# Patient Record
Sex: FEMALE | Race: BLACK OR AFRICAN AMERICAN | NOT HISPANIC OR LATINO | Employment: UNEMPLOYED | RURAL
[De-identification: names, ages, dates, MRNs, and addresses within clinical notes are randomized per-mention and may not be internally consistent; named-entity substitution may affect disease eponyms.]

---

## 2017-02-14 ENCOUNTER — OFFICE VISIT (OUTPATIENT)
Dept: GASTROENTEROLOGY | Facility: CLINIC | Age: 57
End: 2017-02-14

## 2017-02-14 VITALS
HEART RATE: 62 BPM | SYSTOLIC BLOOD PRESSURE: 108 MMHG | HEIGHT: 63 IN | BODY MASS INDEX: 21.44 KG/M2 | WEIGHT: 121 LBS | DIASTOLIC BLOOD PRESSURE: 70 MMHG

## 2017-02-14 DIAGNOSIS — B18.2 CHRONIC HEPATITIS C WITHOUT HEPATIC COMA (HCC): ICD-10-CM

## 2017-02-14 DIAGNOSIS — K21.00 GASTROESOPHAGEAL REFLUX DISEASE WITH ESOPHAGITIS: Primary | ICD-10-CM

## 2017-02-14 DIAGNOSIS — D50.8 OTHER IRON DEFICIENCY ANEMIA: ICD-10-CM

## 2017-02-14 DIAGNOSIS — K59.00 CONSTIPATION, UNSPECIFIED CONSTIPATION TYPE: ICD-10-CM

## 2017-02-14 PROCEDURE — 99213 OFFICE O/P EST LOW 20 MIN: CPT | Performed by: PHYSICIAN ASSISTANT

## 2017-02-14 RX ORDER — FERROUS SULFATE 325(65) MG
325 TABLET ORAL 2 TIMES DAILY
Qty: 60 TABLET | Refills: 3 | Status: SHIPPED | OUTPATIENT
Start: 2017-02-14 | End: 2018-02-15 | Stop reason: SDUPTHER

## 2017-02-14 NOTE — PROGRESS NOTES
Chief Complaint   Patient presents with   • Hepatitis C   • Nausea   • Abdominal Pain   • Constipation   • Heartburn       ENDO PROCEDURE ORDERED:    Subjective    Sultana Saldana is a 56 y.o. female. she is here today for follow-up.    History of Present Illness    Patient seen on a recheck of her chronic hepatitis C, abdominal pain, GERD, constipation.  Last seen 12/20/16.  Genotype 1A.  Laboratory from last visit showed normal or negative ceruloplasmin, GGT, LFTs, INR, alpha-1 antitrypsin, AMA, SMA, DENIS, AFP, she had iron deficiency with serum iron of 15.  HCV fibro-sure 0.19/F0, necroinflammation 0.15/A0.  Patient was also given a trial on Amitiza for chronic constipation.  She states is not really helping.  She is still taking a stool softener but not having good bowel movements.  She states Wednesday she had a lot of bleeding, she filled the commode with clots on the tissue paper, she strained hard before she saw the blood.  Her last colonoscopy was by Dr. Mcintosh and showed hemorrhoids on for/12/16.  She had a diffuse gastritis on EGD on 11/18/16.  She complains of 7 out of 10 lower abdominal pain.  She states cold all the time.  Her weight is up 6 pounds since last visit.  She is on Zantac twice a day for chronic GERD.  Denied nausea, vomiting.    A/P: Moderate epigastric tenderness.  Chronic active hepatitis C, she does not meet treatment criteria according to her insurance.  We'll continue to follow.  She will be due for hepatoma screening in August.  Recommend iron supplementation.  We'll give a trial on Linzess for the constipation and she may stop the Amitiza.  We'll follow-up in 3 months with laboratory prior.  Sooner if needed.  Further pending clinical course and the results of the above.     The following portions of the patient's history were reviewed and updated as appropriate:   Past Medical History   Diagnosis Date   • Acute low back pain    • Acute otitis media    • Acute sinusitis    • Acute  upper respiratory infection    • Asthma    • Bunion    • Degenerative joint disease involving multiple joints    • Dysfunctional uterine bleeding    • Encounter for gynecological examination without abnormal finding    • Essential hypertension    • GEOVANI (generalized anxiety disorder)    • Ganglion and cyst of synovium, tendon and bursa    • Knee pain      strain   • Menopause    • Neck pain    • Onychomycosis    • Otitis media    • Pain in right shoulder    • Postmenopausal bleeding    • Rectal hemorrhage    • Right upper quadrant pain    • Seizure disorder    • Shoulder pain      oa   • Surgery follow-up      Past Surgical History   Procedure Laterality Date   • Injection of medication  02/01/2016     Celestone (betamethasone) (2)      • Hysteroscopy  07/17/2012     exam under anesthesia, diagnostic hysteroscopy, Fractional D&C. Post menopausal bleeding   • Pap smear  10/20/2011     negative   • Upper gastrointestinal endoscopy  11/18/2016   • Colonoscopy  04/12/2016     Family History   Problem Relation Age of Onset   • Adopted: Yes   • Cancer Sister      OB History     No data available        Allergies   Allergen Reactions   • Diphenhydramine      Social History     Social History   • Marital status: Legally      Spouse name: N/A   • Number of children: N/A   • Years of education: N/A     Social History Main Topics   • Smoking status: Never Smoker   • Smokeless tobacco: Never Used   • Alcohol use No   • Drug use: No   • Sexual activity: Not Asked     Other Topics Concern   • None     Social History Narrative       Current Outpatient Prescriptions:   •  acetaminophen-codeine (TYLENOL #3) 300-30 MG per tablet, , Disp: , Rfl:   •  albuterol (PROVENTIL) (2.5 MG/3ML) 0.083% nebulizer solution, Take 2.5 mg by nebulization 4 (four) times a day., Disp: , Rfl:   •  benzonatate (TESSALON) 200 MG capsule, Take 200 mg by mouth 3 (three) times a day as needed for cough., Disp: , Rfl:   •  citalopram (CeleXA) 10 MG  tablet, , Disp: , Rfl:   •  cyclobenzaprine (FLEXERIL) 5 MG tablet, , Disp: , Rfl:   •  docusate calcium (STOOL SOFTENER) 240 MG capsule, Take 240 mg by mouth 2 (two) times a day., Disp: , Rfl:   •  doxepin (SINEquan) 25 MG capsule, Take 25 mg by mouth 3 (three) times a day as needed for sleep., Disp: , Rfl:   •  estrogen, conjugated,-medroxyprogesterone (PREMPRO) 0.3-1.5 MG per tablet, Take 1 tablet by mouth daily., Disp: , Rfl:   •  ferrous sulfate 325 (65 FE) MG tablet, Take 1 tablet by mouth 2 (Two) Times a Day., Disp: 60 tablet, Rfl: 3  •  fluticasone-salmeterol (ADVAIR DISKUS) 250-50 MCG/DOSE DISKUS, Inhale 1 puff 2 (two) times a day., Disp: , Rfl:   •  Linaclotide 145 MCG capsule, Take 145 mcg by mouth Daily., Disp: 30 capsule, Rfl: 3  •  lisinopril-hydrochlorothiazide (PRINZIDE,ZESTORETIC) 10-12.5 MG per tablet, Take 1 tablet by mouth daily., Disp: 30 tablet, Rfl: 3  •  loratadine (CLARITIN) 10 MG tablet, Take 10 mg by mouth daily., Disp: , Rfl:   •  methocarbamol (ROBAXIN) 500 MG tablet, Take 1 tablet by mouth 3 (Three) Times a Day As Needed for muscle spasms., Disp: 60 tablet, Rfl: 3  •  mirtazapine (REMERON) 15 MG tablet, , Disp: , Rfl:   •  mirtazapine (REMERON) 30 MG tablet, Take 30 mg by mouth daily., Disp: , Rfl:   •  neomycin-polymyxin-hydrocortisone (CORTISPORIN) 1 % solution otic solution, Administer 4 drops into both ears 4 (four) times a day., Disp: , Rfl:   •  OLANZapine (ZYPREXA) 10 MG tablet, Take 10 mg by mouth every night., Disp: , Rfl:   •  ondansetron ODT (ZOFRAN-ODT) 4 MG disintegrating tablet, Take 1 tablet by mouth Every 8 (Eight) Hours As Needed for nausea or vomiting., Disp: 30 tablet, Rfl: 1  •  OXcarbazepine (TRILEPTAL) 300 MG tablet, 300 mg 2 (Two) Times a Day., Disp: , Rfl:   •  Oxycodone-Acetaminophen (PERCOCET PO), Take  by mouth every 6 (six) hours as needed., Disp: , Rfl:   •  potassium chloride (KAYCIEL) 20 MEQ/15ML (10%) solution, Take  by mouth Daily., Disp: , Rfl:   •   "promethazine (PHENERGAN) 25 MG tablet, , Disp: , Rfl:   •  ranitidine (ZANTAC) 150 MG capsule, Take 1 capsule by mouth 2 (Two) Times a Day., Disp: 60 capsule, Rfl: 3  •  Spacer/Aero-Holding Chambers (AEROCHAMBER PLUS KAREN-VU) misc, , Disp: , Rfl:   •  sulindac (CLINORIL) 200 MG tablet, Take 1 tablet by mouth 2 (two) times a day., Disp: 60 tablet, Rfl: 3  •  VENTOLIN  (90 BASE) MCG/ACT inhaler, INHALE 2 PUFFS 4 TIMES DAILY, Disp: 18 g, Rfl: 0  Review of Systems  Review of Systems       Objective      Visit Vitals   • /70 (BP Location: Left arm)   • Pulse 62   • Ht 63\" (160 cm)   • Wt 121 lb (54.9 kg)   • BMI 21.43 kg/m2     Physical Exam   Constitutional: She is oriented to person, place, and time. She appears well-developed and well-nourished. She appears distressed.   AA   HENT:   Head: Normocephalic and atraumatic.   Eyes: EOM are normal. Pupils are equal, round, and reactive to light.   Neck: Normal range of motion.   Cardiovascular: Normal rate, regular rhythm and normal heart sounds.    Pulmonary/Chest: Effort normal and breath sounds normal.   Abdominal: Soft. Bowel sounds are normal. She exhibits no shifting dullness, no distension, no abdominal bruit, no ascites and no mass. There is no hepatosplenomegaly. There is tenderness. There is no rigidity, no rebound, no guarding and no CVA tenderness. No hernia. Hernia confirmed negative in the ventral area.   Musculoskeletal: Normal range of motion.   Neurological: She is alert and oriented to person, place, and time.   Skin: Skin is warm and dry.   Psychiatric: She has a normal mood and affect. Her behavior is normal. Judgment and thought content normal.   Nursing note and vitals reviewed.    Hospital Outpatient Visit on 12/20/2016   Component Date Value Ref Range Status   • DENIS 12/20/2016 Negative   Final    Comment:                                      Negative   <1:80                                       Borderline  1:80                            "            Positive   >1:80  Performed at:   - [facility]  [id number]  : Ramakrishna Lamas PhD, Phone:  [phone]       Assessment/Plan      1. Gastroesophageal reflux disease with esophagitis    2. Constipation, unspecified constipation type    3. Chronic hepatitis C without hepatic coma    4. Other iron deficiency anemia    .   Sultana was seen today for hepatitis c, nausea, abdominal pain, constipation and heartburn.    Diagnoses and all orders for this visit:    Gastroesophageal reflux disease with esophagitis    Constipation, unspecified constipation type  -     Linaclotide 145 MCG capsule; Take 145 mcg by mouth Daily.    Chronic hepatitis C without hepatic coma  -     CBC Auto Differential; Future  -     Comprehensive Metabolic Panel; Future  -     Iron; Future  -     HCV FibroSURE; Future    Other iron deficiency anemia  -     ferrous sulfate 325 (65 FE) MG tablet; Take 1 tablet by mouth 2 (Two) Times a Day.  -     CBC Auto Differential; Future  -     Iron; Future        Orders placed during this encounter include:  Orders Placed This Encounter   Procedures   • CBC Auto Differential     Due in May     Standing Status:   Future     Standing Expiration Date:   5/26/2017   • Comprehensive Metabolic Panel     Due in May     Standing Status:   Future     Standing Expiration Date:   5/26/2017   • Iron     Due in May     Standing Status:   Future     Standing Expiration Date:   5/26/2017   • HCV FibroSURE     Due in May     Standing Status:   Future     Standing Expiration Date:   5/26/2017       Medications prescribed:  New Medications Ordered This Visit   Medications   • ferrous sulfate 325 (65 FE) MG tablet     Sig: Take 1 tablet by mouth 2 (Two) Times a Day.     Dispense:  60 tablet     Refill:  3   • Linaclotide 145 MCG capsule     Sig: Take 145 mcg by mouth Daily.     Dispense:  30 capsule     Refill:  3     Discontinued Medications       Reason for Discontinue     lubiprostone (AMITIZA) 24 MCG capsule Not Efficacious        Requested Prescriptions     Signed Prescriptions Disp Refills   • ferrous sulfate 325 (65 FE) MG tablet 60 tablet 3     Sig: Take 1 tablet by mouth 2 (Two) Times a Day.   • Linaclotide 145 MCG capsule 30 capsule 3     Sig: Take 145 mcg by mouth Daily.       Review and/or summary of lab tests, radiology, procedures, medications. Review and summary of old records and obtaining of history. The risks and benefits of my recommendations, as well as other treatment options were discussed with the patient today. Questions were answered.    Follow-up: Return in about 3 months (around 5/14/2017), or if symptoms worsen or fail to improve, for lab prior.           This document has been electronically signed by Diaz Evangelista PA-C on February 27, 2017 6:23 PM      Results for orders placed or performed during the hospital encounter of 12/20/16   Nuclear Antigen Antibody, IFA   Result Value Ref Range    DENIS Negative    Results for orders placed or performed in visit on 12/20/16   HCV FibroSURE   Result Value Ref Range    Fibrosis Score 0.19 0.00 - 0.21    Fibrosis Stage Comment     Necroinflammat Activity Score 0.15 0.00 - 0.17    Necroinflammat Activity Grade A0-No activity     Alpha 2-Macroglobulins, Qn 279 (H) 110 - 276 mg/dL    Haptoglobin 145 34 - 200 mg/dL    Apolipoprotein A-1 141 116 - 209 mg/dL    Total Bilirubin 0.2 0.0 - 1.2 mg/dL    GGT 28 0 - 60 IU/L    ALT (SGPT) 33 0 - 40 IU/L    HCV Qual Interp Comment     Fibrosis Scoring: Comment     Necroinflamm Activity Scoring: Comment     Limitations: Comment     Comment Comment    Iron and TIBC   Result Value Ref Range    Iron 15 (L) 37 - 170 ug/dl    TIBC 293 265 - 497 ug/dl    Iron Saturation 5.1 (L) 15.0 - 50.0 %   Alpha - 1 - Antitrypsin   Result Value Ref Range    A-1 Antitrypsin 149 90 - 200 mg/dL   Mitochondrial Antibodies, M2   Result Value Ref Range    Mitochondrial Ab <20.0 0.0 - 20.0 Units    Ceruloplasmin   Result Value Ref Range    Ceruloplasmin 28.2 19.0 - 39.0 mg/dL   AFP Tumor Marker   Result Value Ref Range    AFP Tumor Marker 4.9 0.0 - 8.3 ng/mL   Hepatitis C Genotype   Result Value Ref Range    Note: Comment     Hepatitis C Genotype 1a    Anti-Smooth Muscle Antibody Titer   Result Value Ref Range    Smooth Muscle Ab 9 0 - 19 Units   Protime-INR   Result Value Ref Range    Protime 12.0 11.1 - 15.3 seconds    INR 0.9 0.8 - 1.2   Gamma GT   Result Value Ref Range    GGT 33 12 - 43 U/L   Ferritin   Result Value Ref Range    Ferritin 128.0 11.1 - 264.0 ng/ml   Hepatic Function Panel   Result Value Ref Range    Total Protein 7.1 6.3 - 8.6 gm/dl    Albumin 3.8 3.4 - 4.8 gm/dl    Bilirubin, Direct 0.0 0.0 - 0.3 mg/dl    Total Bilirubin 0.3 0.2 - 1.3 mg/dl    Alkaline Phosphatase 71 38 - 126 U/L    AST (SGOT) 33 14 - 36 U/L    ALT (SGPT) 44 9 - 52 U/L   Results for orders placed or performed during the hospital encounter of 11/18/16   Converted Surgical Pathology   Result Value Ref Range    Spec Descr 1 SPECIMEN(S): A SMALL BOWEL BIOPSY     Specimen description 2 SPECIMEN(S): B ANTRAL BIOPSY    Results for orders placed or performed during the hospital encounter of 11/15/16   Hepatitis C RNA, Quantitative, PCR (graph)   Result Value Ref Range    Hepatitis C Quantitation 330095 IU/mL    HCV log10 5.708 log10 IU/mL    Test Information Comment    Results for orders placed or performed during the hospital encounter of 11/08/16   Glia(IgA / G) & TTG(IgA / G)   Result Value Ref Range    Tissue Transglutaminase IgA <2 0 - 3 U/mL    Tissue Transglutaminase IgG <2 0 - 5 U/mL    Gliadin Deamidated Peptide Ab, IgA 4 0 - 19 units    Deaminated Gliadin Ab IgG 2 0 - 19 units   Allergens (12) Foods   Result Value Ref Range    Peanut <0.10 Class 0 kU/L    Milk, Cow's <0.10 Class 0 kU/L    Soybean <0.10 Class 0 kU/L    Wheat <0.10 Class 0 kU/L    CodFish <0.10 Class 0 kU/L    Shrimp <0.10 Class 0 kU/L    [place] <0.10  Class 0 kU/L    Egg White <0.10 Class 0 kU/L    Sesame Seed <0.10 Class 0 kU/L    Scallop <0.10 Class 0 kU/L    Hazelnut <0.10 Class 0 kU/L    Gluten <0.10 Class 0 kU/L    Class Description Comment    Results for orders placed or performed in visit on 11/08/16   Hepatitis Panel, Acute   Result Value Ref Range    Hepatitis B Surface Ag Negative Negative    Hep C Virus Ab Positive (qualifier value) (H) Negative    Hep A IgM Negative Negative    Hep B Core IgM Negative Negative   HIV-1 & HIV-2 Antibodies   Result Value Ref Range    HIV-1/ HIV-2 Non Reactive Non Reactive   Results for orders placed or performed in visit on 08/15/16    PAP SMEAR   Result Value Ref Range     Pap smear complete    Results for orders placed or performed during the hospital encounter of 08/02/16   Urinalysis, Microscopic only   Result Value Ref Range    WBC, UA TNTC (H) NONE SEEN,0-2,3-5  /HPF    RBC, UA 13-20 (H) NONE SEEN,0-2,3-5  /HPF    Bacteria, UA 4+ (H) NONE SEEN  /HPF    Epithelial Cells, UA 6-12 (H) NONE SEEN,0-2,3-5  /HPF   Urine drug screen   Result Value Ref Range    Barbiturates Screen, Urine Negative Negative    Benzodiazepine Screen, Urine Negative Negative    Methadone Screen, Urine Negative Negative    Opiate Screen, Urine Negative Negative    THC Screen Interpretation POSITIVE (H) Negative    Amphet/Methamphet, Screen, Urine Negative Negative    Cocaine Screen, Urine Negative Negative    Oxycodone Screen, Urine Negative Negative   Manual Differential   Result Value Ref Range    Neutrophil Rel % 30 (L) 37 - 80 %    Lymphocyte Rel % 62 (H) 10 - 50 %    Monocyte Rel % 4 0 - 12 %    Eosinophil Rel % 4 0 - 7 %    RBC Morphology Normocytic Normochromic     Platelet Estimate Normal     Total Counted 100    Urinalysis   Result Value Ref Range    Color, UA Yellow Yellow    Appearance CLOUDY     Specific Gravity, UA 1.025     pH, UA 5.5 pH Units    Leukocytes, UA 2+ (H) NEGATIVE    Nitrite, UA NEGATIVE NEGATIVE    Protein, UA  TRACE (H) NEGATIVE    Glucose, Urine NEGATIVE NEGATIVE mg/dl    Ketones, UA NEGATIVE NEGATIVE    Urobilinogen, UA 0.2 0.2 EU/dl    Blood, UA 1+ (H) NEGATIVE   CBC and Differential   Result Value Ref Range    WBC 4.5 3.2 - 9.8 x1000/uL    RBC 3.94 3.77 - 5.16 radha/mm3    Hemoglobin 12.0 12.0 - 15.5 gm/dl    Hematocrit 34.3 (L) 35.0 - 45.0 %    MCV 87.1 80.0 - 98.0 fl    MCH 30.5 26.0 - 34.0 pg    MCHC 35.0 31.4 - 36.0 gm/dl    RDW 14.8 (H) 11.5 - 14.5 %    Platelets 142 (L) 150 - 450 x1000/mm3    MPV 12.1 (H) 8.0 - 12.0 fl    Neutrophil Rel % 29.6 (L) 37.0 - 80.0 %    Lymphocyte Rel % 58.5 (H) 10.0 - 50.0 %    Monocyte Rel % 6.4 0.0 - 12.0 %    Eosinophil Rel % 5.1 0.0 - 7.0 %    Basophil Rel % 0.4 0.0 - 2.0 %    Immature Granulocyte Rel % 0.00 0.00 - 0.50 %    Neutrophils Absolute 1.34 (L) 2.00 - 8.60 x1000/uL    Lymphocytes Absolute 2.65 0.60 - 4.20 x1000/uL    Monocytes Absolute 0.29 0.00 - 0.90 x1000/uL    Eosinophils Absolute 0.23 0.00 - 0.70 x1000/uL    Basophils Absolute 0.02 0.00 - 0.20 x1000/uL    Immature Granulocytes Absolute 0.000 (L) 0.005 - 0.022 x1000/uL     *Note: Due to a large number of results and/or encounters for the requested time period, some results have not been displayed. A complete set of results can be found in Results Review.       Some portions of this note have been dictated using voice recognition software and may contain errors and/or omissions.

## 2017-02-20 ENCOUNTER — TELEPHONE (OUTPATIENT)
Dept: GASTROENTEROLOGY | Facility: CLINIC | Age: 57
End: 2017-02-20

## 2017-02-20 NOTE — TELEPHONE ENCOUNTER
Pt rx for Linzess 145 mcg qd required prior authorization.  Authorization is obtained and is valid 02/14/17-12/31/39.   [facility] is notified at [phone].   Attempted to contact pt at [phone].  Left voicemail with information and office number for pt to call if she has any questions.

## 2017-05-01 RX ORDER — POTASSIUM CHLORIDE 20MEQ/15ML
LIQUID (ML) ORAL
Qty: 450 ML | Refills: 0 | Status: SHIPPED | OUTPATIENT
Start: 2017-05-01 | End: 2017-09-01 | Stop reason: SDUPTHER

## 2017-05-01 RX ORDER — LISINOPRIL AND HYDROCHLOROTHIAZIDE 12.5; 1 MG/1; MG/1
TABLET ORAL
Qty: 30 TABLET | Refills: 0 | Status: SHIPPED | OUTPATIENT
Start: 2017-05-01 | End: 2017-09-01 | Stop reason: SDUPTHER

## 2017-05-22 ENCOUNTER — LAB (OUTPATIENT)
Dept: LAB | Facility: CLINIC | Age: 57
End: 2017-05-22

## 2017-05-22 DIAGNOSIS — D50.8 OTHER IRON DEFICIENCY ANEMIA: ICD-10-CM

## 2017-05-22 DIAGNOSIS — B18.2 CHRONIC HEPATITIS C WITHOUT HEPATIC COMA (HCC): ICD-10-CM

## 2017-05-22 DIAGNOSIS — R76.8 HCV ANTIBODY POSITIVE: ICD-10-CM

## 2017-05-22 PROCEDURE — 83883 ASSAY NEPHELOMETRY NOT SPEC: CPT | Performed by: PHYSICIAN ASSISTANT

## 2017-05-22 PROCEDURE — 86038 ANTINUCLEAR ANTIBODIES: CPT | Performed by: PHYSICIAN ASSISTANT

## 2017-05-22 PROCEDURE — 83540 ASSAY OF IRON: CPT | Performed by: PHYSICIAN ASSISTANT

## 2017-05-22 PROCEDURE — 85007 BL SMEAR W/DIFF WBC COUNT: CPT | Performed by: PHYSICIAN ASSISTANT

## 2017-05-22 PROCEDURE — 87522 HEPATITIS C REVRS TRNSCRPJ: CPT | Performed by: PHYSICIAN ASSISTANT

## 2017-05-22 PROCEDURE — 83010 ASSAY OF HAPTOGLOBIN QUANT: CPT | Performed by: PHYSICIAN ASSISTANT

## 2017-05-22 PROCEDURE — 80053 COMPREHEN METABOLIC PANEL: CPT | Performed by: PHYSICIAN ASSISTANT

## 2017-05-22 PROCEDURE — 82977 ASSAY OF GGT: CPT | Performed by: PHYSICIAN ASSISTANT

## 2017-05-22 PROCEDURE — 84460 ALANINE AMINO (ALT) (SGPT): CPT | Performed by: PHYSICIAN ASSISTANT

## 2017-05-22 PROCEDURE — 85025 COMPLETE CBC W/AUTO DIFF WBC: CPT | Performed by: PHYSICIAN ASSISTANT

## 2017-05-22 PROCEDURE — 82247 BILIRUBIN TOTAL: CPT | Performed by: PHYSICIAN ASSISTANT

## 2017-05-23 LAB
ALBUMIN SERPL-MCNC: 4.2 G/DL (ref 3.4–4.8)
ALBUMIN/GLOB SERPL: 1.3 G/DL (ref 1.1–1.8)
ALP SERPL-CCNC: 74 U/L (ref 38–126)
ALT SERPL W P-5'-P-CCNC: 59 U/L (ref 9–52)
ANION GAP SERPL CALCULATED.3IONS-SCNC: 11 MMOL/L (ref 5–15)
AST SERPL-CCNC: 48 U/L (ref 14–36)
BASOPHILS # BLD MANUAL: 0.05 10*3/MM3 (ref 0–0.2)
BASOPHILS NFR BLD AUTO: 1 % (ref 0–2)
BILIRUB SERPL-MCNC: 0.4 MG/DL (ref 0.2–1.3)
BUN BLD-MCNC: 22 MG/DL (ref 7–21)
BUN/CREAT SERPL: 26.8 (ref 7–25)
CALCIUM SPEC-SCNC: 9.7 MG/DL (ref 8.4–10.2)
CHLORIDE SERPL-SCNC: 102 MMOL/L (ref 95–110)
CO2 SERPL-SCNC: 28 MMOL/L (ref 22–31)
CREAT BLD-MCNC: 0.82 MG/DL (ref 0.5–1)
DEPRECATED RDW RBC AUTO: 50 FL (ref 36.4–46.3)
ERYTHROCYTE [DISTWIDTH] IN BLOOD BY AUTOMATED COUNT: 15.7 % (ref 11.5–14.5)
GFR SERPL CREATININE-BSD FRML MDRD: 87 ML/MIN/1.73 (ref 51–120)
GLOBULIN UR ELPH-MCNC: 3.2 GM/DL (ref 2.3–3.5)
GLUCOSE BLD-MCNC: 72 MG/DL (ref 60–100)
HCT VFR BLD AUTO: 37.1 % (ref 35–45)
HGB BLD-MCNC: 12.4 G/DL (ref 12–15.5)
IRON 24H UR-MRATE: <10 MCG/DL (ref 37–170)
LYMPHOCYTES # BLD MANUAL: 2.25 10*3/MM3 (ref 0.6–4.2)
LYMPHOCYTES NFR BLD MANUAL: 45 % (ref 10–50)
LYMPHOCYTES NFR BLD MANUAL: 6 % (ref 0–12)
MCH RBC QN AUTO: 29.2 PG (ref 26.5–34)
MCHC RBC AUTO-ENTMCNC: 33.4 G/DL (ref 31.4–36)
MCV RBC AUTO: 87.5 FL (ref 80–98)
MONOCYTES # BLD AUTO: 0.3 10*3/MM3 (ref 0–0.9)
NEUTROPHILS # BLD AUTO: 2.4 10*3/MM3 (ref 2–8.6)
NEUTROPHILS NFR BLD MANUAL: 48 % (ref 37–80)
PLATELET # BLD AUTO: 168 10*3/MM3 (ref 150–450)
PMV BLD AUTO: 11.8 FL (ref 8–12)
POTASSIUM BLD-SCNC: 3.8 MMOL/L (ref 3.5–5.1)
PROT SERPL-MCNC: 7.4 G/DL (ref 6.3–8.6)
RBC # BLD AUTO: 4.24 10*6/MM3 (ref 3.77–5.16)
RBC MORPH BLD: NORMAL
SMALL PLATELETS BLD QL SMEAR: ADEQUATE
SODIUM BLD-SCNC: 141 MMOL/L (ref 137–145)
WBC MORPH BLD: NORMAL
WBC NRBC COR # BLD: 5 10*3/MM3 (ref 3.2–9.8)

## 2017-05-24 LAB
HCV RNA SERPL NAA+PROBE-ACNC: NORMAL IU/ML
HCV RNA SERPL NAA+PROBE-LOG IU: 5.82 LOG10 IU/ML
TEST INFORMATION: NORMAL

## 2017-05-25 LAB
A2 MACROGLOB SERPL-MCNC: 271 MG/DL (ref 110–276)
ALT SERPL W P-5'-P-CCNC: 37 IU/L (ref 0–40)
APO A-I SERPL-MCNC: 147 MG/DL (ref 116–209)
BILIRUB SERPL-MCNC: 0.3 MG/DL (ref 0–1.2)
FIBROSIS SCORING:: ABNORMAL
FIBROSIS STAGE SERPL QL: ABNORMAL
GGT SERPL-CCNC: 26 IU/L (ref 0–60)
HAPTOGLOB SERPL-MCNC: 133 MG/DL (ref 34–200)
HCV AB SER QL: ABNORMAL
LABORATORY COMMENT REPORT: ABNORMAL
LIMITATIONS:: ABNORMAL
LIVER FIBR SCORE SERPL CALC.FIBROSURE: 0.23 (ref 0–0.21)
NECROINFLAMM ACTIVITY SCORING:: ABNORMAL
NECROINFLAMMATORY ACT GRADE SERPL QL: ABNORMAL
NECROINFLAMMATORY ACT SCORE SERPL: 0.18 (ref 0–0.17)

## 2017-06-06 RX ORDER — DOCUSATE CALCIUM 240 MG
CAPSULE ORAL
Qty: 60 CAPSULE | Refills: 0 | Status: SHIPPED | OUTPATIENT
Start: 2017-06-06 | End: 2017-09-01 | Stop reason: SDUPTHER

## 2017-06-28 DIAGNOSIS — M25.511 RIGHT SHOULDER PAIN, UNSPECIFIED CHRONICITY: ICD-10-CM

## 2017-06-28 DIAGNOSIS — R11.0 NAUSEA: ICD-10-CM

## 2017-06-29 RX ORDER — DOCUSATE CALCIUM 240 MG
CAPSULE ORAL
Qty: 60 CAPSULE | Refills: 0 | OUTPATIENT
Start: 2017-06-29

## 2017-06-29 RX ORDER — POTASSIUM CHLORIDE 20MEQ/15ML
LIQUID (ML) ORAL
Qty: 450 ML | Refills: 0 | OUTPATIENT
Start: 2017-06-29

## 2017-06-29 RX ORDER — ONDANSETRON 4 MG/1
TABLET, ORALLY DISINTEGRATING ORAL
Qty: 30 TABLET | Refills: 0 | OUTPATIENT
Start: 2017-06-29

## 2017-06-29 RX ORDER — METHOCARBAMOL 500 MG/1
TABLET, FILM COATED ORAL
Qty: 60 TABLET | Refills: 0 | OUTPATIENT
Start: 2017-06-29

## 2017-08-03 ENCOUNTER — OFFICE VISIT (OUTPATIENT)
Dept: FAMILY MEDICINE CLINIC | Facility: CLINIC | Age: 57
End: 2017-08-03

## 2017-08-03 VITALS
TEMPERATURE: 97.3 F | HEIGHT: 64 IN | WEIGHT: 117.5 LBS | SYSTOLIC BLOOD PRESSURE: 114 MMHG | OXYGEN SATURATION: 97 % | RESPIRATION RATE: 16 BRPM | DIASTOLIC BLOOD PRESSURE: 66 MMHG | HEART RATE: 57 BPM | BODY MASS INDEX: 20.06 KG/M2

## 2017-08-03 DIAGNOSIS — N39.0 URINARY TRACT INFECTION WITHOUT HEMATURIA, SITE UNSPECIFIED: Primary | ICD-10-CM

## 2017-08-03 DIAGNOSIS — R30.0 DYSURIA: ICD-10-CM

## 2017-08-03 DIAGNOSIS — J45.909 UNCOMPLICATED ASTHMA, UNSPECIFIED ASTHMA SEVERITY: ICD-10-CM

## 2017-08-03 DIAGNOSIS — Z13.1 SCREENING FOR DIABETES MELLITUS: ICD-10-CM

## 2017-08-03 DIAGNOSIS — R10.11 RIGHT UPPER QUADRANT PAIN: ICD-10-CM

## 2017-08-03 DIAGNOSIS — H60.90 OTITIS EXTERNA, UNSPECIFIED CHRONICITY, UNSPECIFIED LATERALITY, UNSPECIFIED TYPE: ICD-10-CM

## 2017-08-03 DIAGNOSIS — R53.83 OTHER FATIGUE: ICD-10-CM

## 2017-08-03 DIAGNOSIS — D50.9 IRON DEFICIENCY ANEMIA, UNSPECIFIED IRON DEFICIENCY ANEMIA TYPE: ICD-10-CM

## 2017-08-03 DIAGNOSIS — K59.00 CONSTIPATION, UNSPECIFIED CONSTIPATION TYPE: ICD-10-CM

## 2017-08-03 LAB
BILIRUB BLD-MCNC: NEGATIVE MG/DL
CLARITY, POC: ABNORMAL
COLOR UR: ABNORMAL
GLUCOSE UR STRIP-MCNC: NEGATIVE MG/DL
KETONES UR QL: NEGATIVE
LEUKOCYTE EST, POC: ABNORMAL
NITRITE UR-MCNC: NEGATIVE MG/ML
PH UR: 6 [PH] (ref 5–8)
PROT UR STRIP-MCNC: ABNORMAL MG/DL
RBC # UR STRIP: NEGATIVE /UL
SP GR UR: 1.01 (ref 1–1.03)
UROBILINOGEN UR QL: NORMAL

## 2017-08-03 PROCEDURE — 99214 OFFICE O/P EST MOD 30 MIN: CPT | Performed by: NURSE PRACTITIONER

## 2017-08-03 PROCEDURE — 80053 COMPREHEN METABOLIC PANEL: CPT | Performed by: NURSE PRACTITIONER

## 2017-08-03 PROCEDURE — 83540 ASSAY OF IRON: CPT | Performed by: NURSE PRACTITIONER

## 2017-08-03 PROCEDURE — 85025 COMPLETE CBC W/AUTO DIFF WBC: CPT | Performed by: NURSE PRACTITIONER

## 2017-08-03 PROCEDURE — 80050 GENERAL HEALTH PANEL: CPT | Performed by: NURSE PRACTITIONER

## 2017-08-03 PROCEDURE — 85007 BL SMEAR W/DIFF WBC COUNT: CPT | Performed by: NURSE PRACTITIONER

## 2017-08-03 PROCEDURE — 83550 IRON BINDING TEST: CPT | Performed by: NURSE PRACTITIONER

## 2017-08-03 RX ORDER — SULFAMETHOXAZOLE AND TRIMETHOPRIM 800; 160 MG/1; MG/1
1 TABLET ORAL 2 TIMES DAILY
Qty: 20 TABLET | Refills: 0 | Status: SHIPPED | OUTPATIENT
Start: 2017-08-03 | End: 2017-08-15

## 2017-08-03 RX ORDER — NEOMYCIN SULFATE, POLYMYXIN B SULFATE, HYDROCORTISONE 3.5; 10000; 1 MG/ML; [USP'U]/ML; MG/ML
4 SOLUTION/ DROPS AURICULAR (OTIC) 4 TIMES DAILY
Qty: 10 ML | Refills: 2 | Status: SHIPPED | OUTPATIENT
Start: 2017-08-03 | End: 2018-02-15

## 2017-08-03 RX ORDER — ALBUTEROL SULFATE 2.5 MG/3ML
2.5 SOLUTION RESPIRATORY (INHALATION)
Qty: 3 ML | Refills: 5 | Status: SHIPPED | OUTPATIENT
Start: 2017-08-03 | End: 2018-02-15 | Stop reason: SDUPTHER

## 2017-08-03 NOTE — PROGRESS NOTES
Subjective   Sultana Paolo Saldana is a 56 y.o. female.     HPI Comments: Here today for mariely.  She thinks she has a uti.   She has been having some dysuria and incont.  Also she has problems with constipation and hemorrhoidal bleeding when she becomes extremely constipated.  She has been on lizness in the past and it worked well.    She is experiencing weight loss, right upper quad pain and nausea.  She has been found to have + hep c and is seeing gastro for this.    Urinary Tract Infection    This is a new problem. The current episode started in the past 7 days. The problem occurs every urination. The problem has been unchanged. The quality of the pain is described as burning. The pain is at a severity of 3/10. The pain is moderate. There has been no fever. Associated symptoms include nausea and urgency. Pertinent negatives include no chills, discharge, flank pain, frequency, hematuria, hesitancy, possible pregnancy, sweats or vomiting. She has tried nothing for the symptoms. The treatment provided no relief.   Constipation   Associated symptoms include abdominal pain, nausea and weight loss. Pertinent negatives include no anorexia, diarrhea, fever, flatus, hematochezia, melena or vomiting. There is no history of abdominal surgery or irritable bowel syndrome.   Abdominal Pain   This is a new problem. The current episode started more than 1 month ago. The onset quality is sudden. The problem occurs intermittently. The pain is located in the RUQ. The pain is at a severity of 6/10. The pain is moderate. The quality of the pain is colicky. The abdominal pain does not radiate. Associated symptoms include constipation, nausea and weight loss. Pertinent negatives include no anorexia, arthralgias, belching, diarrhea, dysuria, fever, flatus, frequency, headaches, hematochezia, hematuria, melena, myalgias or vomiting. Exacerbated by: certain foods. The pain is relieved by nothing. She has tried nothing for the symptoms. There is  no history of abdominal surgery, colon cancer, Crohn's disease, gallstones, GERD, irritable bowel syndrome, pancreatitis, PUD or ulcerative colitis.        The following portions of the patient's history were reviewed and updated as appropriate: allergies, current medications, past family history, past medical history, past social history, past surgical history and problem list.    Review of Systems   Constitutional: Positive for weight loss. Negative for chills and fever.   HENT: Negative.    Respiratory: Negative.    Cardiovascular: Negative.    Gastrointestinal: Positive for abdominal pain, constipation and nausea. Negative for anorexia, diarrhea, flatus, hematochezia, melena and vomiting.   Genitourinary: Positive for urgency. Negative for dysuria, flank pain, frequency, hematuria and hesitancy.   Musculoskeletal: Negative.  Negative for arthralgias and myalgias.   Skin: Negative.    Neurological: Negative.  Negative for headaches.   Psychiatric/Behavioral: Negative.        Objective   Physical Exam   Constitutional: She is oriented to person, place, and time. She appears well-developed and well-nourished. No distress.   HENT:   Head: Normocephalic.   Eyes: Pupils are equal, round, and reactive to light.   Neck: Normal range of motion. Neck supple. No thyromegaly present.   Cardiovascular: Normal rate, regular rhythm and normal heart sounds.  Exam reveals no friction rub.    No murmur heard.  Pulmonary/Chest: Effort normal and breath sounds normal. No respiratory distress. She has no wheezes. She has no rales.   Abdominal: Soft. Bowel sounds are normal. She exhibits no distension and no mass. There is tenderness (right upper quad). There is no rebound and no guarding. No hernia.   Musculoskeletal: Normal range of motion.   Neurological: She is alert and oriented to person, place, and time.   Skin: Skin is warm and dry.   Psychiatric: She has a normal mood and affect. Thought content normal.   Nursing note and  vitals reviewed.      Assessment/Plan   Sultana was seen today for constipation and urinary tract infection.    Diagnoses and all orders for this visit:    Urinary tract infection without hematuria, site unspecified  -     sulfamethoxazole-trimethoprim (BACTRIM DS,SEPTRA DS) 800-160 MG per tablet; Take 1 tablet by mouth 2 (Two) Times a Day.    Right upper quadrant pain  -     US Gallbladder; Future    Dysuria  -     POCT urinalysis dipstick, multipro    Uncomplicated asthma, unspecified asthma severity  -     Home Nebulizer  -     Home Nebulizer Accessories  -     albuterol (PROVENTIL) (2.5 MG/3ML) 0.083% nebulizer solution; Take 2.5 mg by nebulization 4 (Four) Times a Day. Give enough for a month    Constipation, unspecified constipation type  -     linaclotide (LINZESS) 145 MCG capsule capsule; Take 1 capsule by mouth Daily.    Otitis externa, unspecified chronicity, unspecified laterality, unspecified type  -     neomycin-polymyxin-hydrocortisone (CORTISPORIN) 1 % solution otic solution; Administer 4 drops into both ears 4 (Four) Times a Day.    Screening for diabetes mellitus  -     Comprehensive metabolic panel    Other fatigue  -     TSH    Iron deficiency anemia, unspecified iron deficiency anemia type  -     CBC & Differential  -     Iron Profile  -     CBC Auto Differential      Brief Urine Lab Results  (Last result in the past 365 days)      Color   Clarity   Blood   Leuk Est   Nitrite   Protein   CREAT   Urine HCG        08/03/17 1345 Jovita Slightly Cloudy(A) Negative Small (1+)(A) Negative 3+(A)             The uti is being treated.  She will go for an u/s of her gallbladder.  She is placed back on linzess for constipation.  She reports that her home nebulizer is broken and she needs a replacement.

## 2017-08-04 LAB
ALBUMIN SERPL-MCNC: 4 G/DL (ref 3.4–4.8)
ALBUMIN/GLOB SERPL: 1.4 G/DL (ref 1.1–1.8)
ALP SERPL-CCNC: 67 U/L (ref 38–126)
ALT SERPL W P-5'-P-CCNC: 57 U/L (ref 9–52)
ANION GAP SERPL CALCULATED.3IONS-SCNC: 8 MMOL/L (ref 5–15)
ANISOCYTOSIS BLD QL: ABNORMAL
AST SERPL-CCNC: 38 U/L (ref 14–36)
BASOPHILS # BLD AUTO: 0.02 10*3/MM3 (ref 0–0.2)
BASOPHILS NFR BLD AUTO: 0.5 % (ref 0–2)
BILIRUB SERPL-MCNC: 0.4 MG/DL (ref 0.2–1.3)
BUN BLD-MCNC: 16 MG/DL (ref 7–21)
BUN/CREAT SERPL: 21.1 (ref 7–25)
CALCIUM SPEC-SCNC: 9.6 MG/DL (ref 8.4–10.2)
CHLORIDE SERPL-SCNC: 106 MMOL/L (ref 95–110)
CO2 SERPL-SCNC: 27 MMOL/L (ref 22–31)
CREAT BLD-MCNC: 0.76 MG/DL (ref 0.5–1)
DEPRECATED RDW RBC AUTO: 47.9 FL (ref 36.4–46.3)
EOSINOPHIL # BLD AUTO: 0.09 10*3/MM3 (ref 0–0.7)
EOSINOPHIL NFR BLD AUTO: 2.1 % (ref 0–7)
ERYTHROCYTE [DISTWIDTH] IN BLOOD BY AUTOMATED COUNT: 14.6 % (ref 11.5–14.5)
GFR SERPL CREATININE-BSD FRML MDRD: 95 ML/MIN/1.73 (ref 51–120)
GLOBULIN UR ELPH-MCNC: 2.8 GM/DL (ref 2.3–3.5)
GLUCOSE BLD-MCNC: 65 MG/DL (ref 60–100)
HCT VFR BLD AUTO: 37.2 % (ref 35–45)
HGB BLD-MCNC: 12.6 G/DL (ref 12–15.5)
IMM GRANULOCYTES # BLD: 0 10*3/MM3 (ref 0–0.02)
IMM GRANULOCYTES NFR BLD: 0 % (ref 0–0.5)
IRON 24H UR-MRATE: <10 MCG/DL (ref 37–170)
IRON SATN MFR SERPL: ABNORMAL % (ref 15–50)
LYMPHOCYTES # BLD AUTO: 2.47 10*3/MM3 (ref 0.6–4.2)
LYMPHOCYTES # BLD MANUAL: 2.68 10*3/MM3 (ref 0.6–4.2)
LYMPHOCYTES NFR BLD AUTO: 57.2 % (ref 10–50)
LYMPHOCYTES NFR BLD MANUAL: 4 % (ref 0–12)
LYMPHOCYTES NFR BLD MANUAL: 62 % (ref 10–50)
MCH RBC QN AUTO: 30.4 PG (ref 26.5–34)
MCHC RBC AUTO-ENTMCNC: 33.9 G/DL (ref 31.4–36)
MCV RBC AUTO: 89.6 FL (ref 80–98)
MONOCYTES # BLD AUTO: 0.17 10*3/MM3 (ref 0–0.9)
MONOCYTES # BLD AUTO: 0.27 10*3/MM3 (ref 0–0.9)
MONOCYTES NFR BLD AUTO: 6.3 % (ref 0–12)
NEUTROPHILS # BLD AUTO: 1.47 10*3/MM3 (ref 2–8.6)
NEUTROPHILS # BLD AUTO: 1.47 10*3/MM3 (ref 2–8.6)
NEUTROPHILS NFR BLD AUTO: 33.9 % (ref 37–80)
NEUTROPHILS NFR BLD MANUAL: 27 % (ref 37–80)
NEUTS BAND NFR BLD MANUAL: 7 % (ref 0–5)
PLATELET # BLD AUTO: 149 10*3/MM3 (ref 150–450)
PMV BLD AUTO: 12.8 FL (ref 8–12)
POTASSIUM BLD-SCNC: 3.9 MMOL/L (ref 3.5–5.1)
PROT SERPL-MCNC: 6.8 G/DL (ref 6.3–8.6)
RBC # BLD AUTO: 4.15 10*6/MM3 (ref 3.77–5.16)
SMALL PLATELETS BLD QL SMEAR: ABNORMAL
SODIUM BLD-SCNC: 141 MMOL/L (ref 137–145)
TIBC SERPL-MCNC: 295 MCG/DL (ref 265–497)
TSH SERPL DL<=0.05 MIU/L-ACNC: 0.96 MIU/ML (ref 0.46–4.68)
WBC MORPH BLD: NORMAL
WBC NRBC COR # BLD: 4.32 10*3/MM3 (ref 3.2–9.8)

## 2017-08-09 ENCOUNTER — TELEPHONE (OUTPATIENT)
Dept: FAMILY MEDICINE CLINIC | Facility: CLINIC | Age: 57
End: 2017-08-09

## 2017-08-09 NOTE — TELEPHONE ENCOUNTER
GALLBLADDER U/S APPT ON 08/15/2017 AT 8 AM NPO AFTER MIDNIGHT AT [facility] [place]

## 2017-08-15 ENCOUNTER — OFFICE VISIT (OUTPATIENT)
Dept: GASTROENTEROLOGY | Facility: CLINIC | Age: 57
End: 2017-08-15

## 2017-08-15 VITALS
SYSTOLIC BLOOD PRESSURE: 96 MMHG | HEIGHT: 63 IN | DIASTOLIC BLOOD PRESSURE: 62 MMHG | WEIGHT: 111 LBS | BODY MASS INDEX: 19.67 KG/M2 | HEART RATE: 61 BPM

## 2017-08-15 DIAGNOSIS — B18.2 CHRONIC HEPATITIS C WITHOUT HEPATIC COMA (HCC): Primary | ICD-10-CM

## 2017-08-15 DIAGNOSIS — K21.00 GASTROESOPHAGEAL REFLUX DISEASE WITH ESOPHAGITIS: ICD-10-CM

## 2017-08-15 DIAGNOSIS — D50.8 OTHER IRON DEFICIENCY ANEMIA: ICD-10-CM

## 2017-08-15 DIAGNOSIS — R10.11 RIGHT UPPER QUADRANT ABDOMINAL PAIN: ICD-10-CM

## 2017-08-15 DIAGNOSIS — R63.4 WEIGHT LOSS, ABNORMAL: ICD-10-CM

## 2017-08-15 PROCEDURE — 99214 OFFICE O/P EST MOD 30 MIN: CPT | Performed by: PHYSICIAN ASSISTANT

## 2017-08-15 RX ORDER — DEXTROSE AND SODIUM CHLORIDE 5; .45 G/100ML; G/100ML
30 INJECTION, SOLUTION INTRAVENOUS CONTINUOUS PRN
Status: CANCELLED | OUTPATIENT
Start: 2017-09-07

## 2017-08-15 RX ORDER — SERTRALINE HYDROCHLORIDE 25 MG/1
25 TABLET, FILM COATED ORAL NIGHTLY
COMMUNITY
Start: 2017-06-23 | End: 2017-09-06

## 2017-08-15 RX ORDER — SODIUM CHLORIDE 0.9 % (FLUSH) 0.9 %
1-10 SYRINGE (ML) INJECTION AS NEEDED
Status: CANCELLED | OUTPATIENT
Start: 2017-09-07

## 2017-08-15 RX ORDER — MULTIVIT-MIN/IRON FUM/FOLIC AC 7.5 MG-4
1 TABLET ORAL DAILY
Qty: 30 TABLET | Refills: 11 | Status: SHIPPED | OUTPATIENT
Start: 2017-08-15 | End: 2018-08-15

## 2017-09-01 RX ORDER — DOCUSATE CALCIUM 240 MG
CAPSULE ORAL
Qty: 60 CAPSULE | Refills: 0 | Status: SHIPPED | OUTPATIENT
Start: 2017-09-01 | End: 2017-09-06

## 2017-09-01 RX ORDER — LISINOPRIL AND HYDROCHLOROTHIAZIDE 12.5; 1 MG/1; MG/1
TABLET ORAL
Qty: 30 TABLET | Refills: 0 | Status: SHIPPED | OUTPATIENT
Start: 2017-09-01 | End: 2017-09-06

## 2017-09-01 RX ORDER — POTASSIUM CHLORIDE 20MEQ/15ML
LIQUID (ML) ORAL
Qty: 450 ML | Refills: 0 | Status: SHIPPED | OUTPATIENT
Start: 2017-09-01 | End: 2017-09-06 | Stop reason: SDUPTHER

## 2017-09-06 RX ORDER — ALBUTEROL SULFATE 90 UG/1
2 AEROSOL, METERED RESPIRATORY (INHALATION) EVERY 4 HOURS PRN
COMMUNITY
End: 2018-02-15 | Stop reason: SDUPTHER

## 2017-09-06 RX ORDER — POTASSIUM CHLORIDE 20MEQ/15ML
15 LIQUID (ML) ORAL DAILY
COMMUNITY
End: 2018-01-05 | Stop reason: SDUPTHER

## 2017-09-06 RX ORDER — LISINOPRIL AND HYDROCHLOROTHIAZIDE 12.5; 1 MG/1; MG/1
1 TABLET ORAL DAILY
COMMUNITY
End: 2017-10-23 | Stop reason: SDUPTHER

## 2017-09-07 ENCOUNTER — HOSPITAL ENCOUNTER (OUTPATIENT)
Facility: HOSPITAL | Age: 57
Setting detail: HOSPITAL OUTPATIENT SURGERY
Discharge: HOME | End: 2017-09-07
Attending: INTERNAL MEDICINE | Admitting: INTERNAL MEDICINE

## 2017-09-07 ENCOUNTER — ANESTHESIA (OUTPATIENT)
Dept: GASTROENTEROLOGY | Facility: HOSPITAL | Age: 57
End: 2017-09-07

## 2017-09-07 ENCOUNTER — ANESTHESIA EVENT (OUTPATIENT)
Dept: GASTROENTEROLOGY | Facility: HOSPITAL | Age: 57
End: 2017-09-07

## 2017-09-07 VITALS
WEIGHT: 106 LBS | OXYGEN SATURATION: 99 % | HEIGHT: 64 IN | HEART RATE: 58 BPM | SYSTOLIC BLOOD PRESSURE: 95 MMHG | DIASTOLIC BLOOD PRESSURE: 68 MMHG | BODY MASS INDEX: 18.1 KG/M2 | TEMPERATURE: 96.6 F | RESPIRATION RATE: 17 BRPM

## 2017-09-07 DIAGNOSIS — D50.8 OTHER IRON DEFICIENCY ANEMIA: ICD-10-CM

## 2017-09-07 DIAGNOSIS — R10.11 RIGHT UPPER QUADRANT ABDOMINAL PAIN: ICD-10-CM

## 2017-09-07 PROCEDURE — 25010000002 PROPOFOL 10 MG/ML EMULSION: Performed by: NURSE ANESTHETIST, CERTIFIED REGISTERED

## 2017-09-07 PROCEDURE — 88305 TISSUE EXAM BY PATHOLOGIST: CPT | Performed by: INTERNAL MEDICINE

## 2017-09-07 PROCEDURE — 88305 TISSUE EXAM BY PATHOLOGIST: CPT | Performed by: PATHOLOGY

## 2017-09-07 PROCEDURE — 43239 EGD BIOPSY SINGLE/MULTIPLE: CPT | Performed by: INTERNAL MEDICINE

## 2017-09-07 RX ORDER — DEXTROSE AND SODIUM CHLORIDE 5; .45 G/100ML; G/100ML
20 INJECTION, SOLUTION INTRAVENOUS CONTINUOUS
Status: DISCONTINUED | OUTPATIENT
Start: 2017-09-07 | End: 2017-09-07 | Stop reason: HOSPADM

## 2017-09-07 RX ORDER — LIDOCAINE HYDROCHLORIDE 10 MG/ML
INJECTION, SOLUTION INFILTRATION; PERINEURAL AS NEEDED
Status: DISCONTINUED | OUTPATIENT
Start: 2017-09-07 | End: 2017-09-07 | Stop reason: SURG

## 2017-09-07 RX ORDER — PROPOFOL 10 MG/ML
VIAL (ML) INTRAVENOUS AS NEEDED
Status: DISCONTINUED | OUTPATIENT
Start: 2017-09-07 | End: 2017-09-07 | Stop reason: SURG

## 2017-09-07 RX ADMIN — PROPOFOL 30 MG: 10 INJECTION, EMULSION INTRAVENOUS at 14:24

## 2017-09-07 RX ADMIN — PROPOFOL 40 MG: 10 INJECTION, EMULSION INTRAVENOUS at 14:23

## 2017-09-07 RX ADMIN — LIDOCAINE HYDROCHLORIDE 80 MG: 10 INJECTION, SOLUTION INFILTRATION; PERINEURAL at 14:22

## 2017-09-07 RX ADMIN — DEXTROSE AND SODIUM CHLORIDE: 5; 450 INJECTION, SOLUTION INTRAVENOUS at 14:19

## 2017-09-07 RX ADMIN — PROPOFOL 100 MG: 10 INJECTION, EMULSION INTRAVENOUS at 14:22

## 2017-09-07 NOTE — H&P (VIEW-ONLY)
Chief Complaint   Patient presents with   • Heartburn   • Constipation   • Hepatitis C   • Anemia       ENDO PROCEDURE ORDERED: EGD, poss PUD/RUQ    Subjective    Sultana Saldana is a 56 y.o. female. she is here today for follow-up.    History of Present Illness    Patient seen on a recheck of her chronic active hepatitis C, GERD, constipation, anemia.  Last seen 2/14/17.  Genotype 1A.  F0.  She was given a trial of Linzess but never returned for follow-up.  At that visit she complained of 7 out of 10 abdominal pain.  She was due for hepatoma screening in August.  She states she went to [facility] after cutting her leg.  She feels very weak and tired.  She is very anxious.  Her weight is down 10 pounds since last visit.  She thinks that the Linzess 145 µg does help her bowel movements.  She does have iron.  She complains of constant pain in her left neck.  She has nausea that comes and goes.  Worse when she eats.  She feels like she needs to vomit.  She complains of 10 out of 10 right upper quadrant pain.  She is on Zantac.  Last colonoscopy showed hemorrhoids on for/12/16.    Laboratory on 5/22/17 HCV fibro-sure 0.23/F0-F1, inflammation 0.18/A0-A1.  Serum iron was less than 10.  CMP showed a BUN of 22, AST 48, ALT 59, otherwise normal.  CBC was normal.  HCV RNA by PCR quantitative 655,000 international units per mL, equivalent to 5.816 log IUs.  DENIS was negative.    Repeat laboratories on 8/3/17 shows serum iron less than 10, normal TSH, CBC showed 149 platelets.  CMP showed an AST of 38, ALT 57, otherwise normal.  TSH normal.  Urinalysis showed protein with trace abnormalities.    A/P: Chronic active hepatitis C, patient currently does not meet treatment criteria according to her insurance, I have told her there are some new treatment options that may help.  She may have peptic ulcer disease given her nausea and severe abdominal pain, recommended EGD to evaluate.  She states she is scheduled for an ultrasound  on 8/23/17.  She needs to take her iron more regularly.  May need to consider iron infusions.  We'll see her in follow-up after the above, further pending clinical course and the results of the above.     The following portions of the patient's history were reviewed and updated as appropriate:   Past Medical History:   Diagnosis Date   • Acute low back pain    • Acute otitis media    • Acute sinusitis    • Acute upper respiratory infection    • Asthma    • Bunion    • Degenerative joint disease involving multiple joints    • Dysfunctional uterine bleeding    • Encounter for gynecological examination without abnormal finding    • Essential hypertension    • GEOVANI (generalized anxiety disorder)    • Ganglion and cyst of synovium, tendon and bursa    • Knee pain     strain   • Menopause    • Neck pain    • Onychomycosis    • Otitis media    • Pain in right shoulder    • Postmenopausal bleeding    • Rectal hemorrhage    • Right upper quadrant pain    • Seizure disorder    • Shoulder pain     oa   • Surgery follow-up      Past Surgical History:   Procedure Laterality Date   • COLONOSCOPY  04/12/2016   • HYSTEROSCOPY  07/17/2012    exam under anesthesia, diagnostic hysteroscopy, Fractional D&C. Post menopausal bleeding   • INJECTION OF MEDICATION  02/01/2016    Celestone (betamethasone) (2)      • PAP SMEAR  10/20/2011    negative   • TONSILLECTOMY     • UPPER GASTROINTESTINAL ENDOSCOPY  11/18/2016     Family History   Problem Relation Age of Onset   • Adopted: Yes   • Cancer Sister      OB History     No data available        Allergies   Allergen Reactions   • Diphenhydramine Swelling     Social History     Social History   • Marital status: Legally      Spouse name: N/A   • Number of children: N/A   • Years of education: N/A     Social History Main Topics   • Smoking status: Never Smoker   • Smokeless tobacco: Never Used   • Alcohol use No   • Drug use: No   • Sexual activity: Not Asked     Other Topics Concern    • None     Social History Narrative       Current Outpatient Prescriptions:   •  albuterol (PROVENTIL) (2.5 MG/3ML) 0.083% nebulizer solution, Take 2.5 mg by nebulization 4 (Four) Times a Day. Give enough for a month, Disp: 3 mL, Rfl: 5  •  citalopram (CeleXA) 10 MG tablet, Take 10 mg by mouth Daily., Disp: , Rfl:   •  cyclobenzaprine (FLEXERIL) 5 MG tablet, Take 5 mg by mouth Daily As Needed., Disp: , Rfl:   •  doxepin (SINEquan) 25 MG capsule, Take 25 mg by mouth 3 (three) times a day as needed for sleep., Disp: , Rfl:   •  estrogen, conjugated,-medroxyprogesterone (PREMPRO) 0.3-1.5 MG per tablet, Take 1 tablet by mouth daily., Disp: , Rfl:   •  fluticasone-salmeterol (ADVAIR DISKUS) 250-50 MCG/DOSE DISKUS, Inhale 1 puff 2 (two) times a day., Disp: , Rfl:   •  linaclotide (LINZESS) 145 MCG capsule capsule, Take 1 capsule by mouth Daily., Disp: 30 capsule, Rfl: 3  •  loratadine (CLARITIN) 10 MG tablet, Take 10 mg by mouth daily., Disp: , Rfl:   •  methocarbamol (ROBAXIN) 500 MG tablet, Take 1 tablet by mouth 3 (Three) Times a Day As Needed for muscle spasms., Disp: 60 tablet, Rfl: 3  •  neomycin-polymyxin-hydrocortisone (CORTISPORIN) 1 % solution otic solution, Administer 4 drops into both ears 4 (Four) Times a Day., Disp: 10 mL, Rfl: 2  •  OLANZapine (ZYPREXA) 10 MG tablet, Take 10 mg by mouth every night., Disp: , Rfl:   •  ondansetron ODT (ZOFRAN-ODT) 4 MG disintegrating tablet, Take 1 tablet by mouth Every 8 (Eight) Hours As Needed for nausea or vomiting., Disp: 30 tablet, Rfl: 1  •  OXcarbazepine (TRILEPTAL) 300 MG tablet, 300 mg 2 (Two) Times a Day., Disp: , Rfl:   •  promethazine (PHENERGAN) 25 MG tablet, Take 25 mg by mouth Every 6 (Six) Hours As Needed., Disp: , Rfl:   •  Spacer/Aero-Holding Chambers (AEROCHAMBER PLUS KAREN-VU) misc, , Disp: , Rfl:   •  albuterol (VENTOLIN HFA) 108 (90 Base) MCG/ACT inhaler, Inhale 2 puffs Every 4 (Four) Hours As Needed for Wheezing., Disp: , Rfl:   •  ferrous sulfate 325  "(65 FE) MG tablet, Take 1 tablet by mouth 2 (Two) Times a Day., Disp: 60 tablet, Rfl: 3  •  lisinopril-hydrochlorothiazide (PRINZIDE,ZESTORETIC) 10-12.5 MG per tablet, Take 1 tablet by mouth Daily., Disp: , Rfl:   •  Multiple Vitamins-Minerals (MULTIVITAMIN WITH MINERALS) tablet, Take 1 tablet by mouth Daily., Disp: 30 tablet, Rfl: 11  •  potassium chloride (KAYCIEL) 20 MEQ/15ML (10%) solution, Take 15 mEq by mouth Daily., Disp: , Rfl:   Review of Systems  Review of Systems       Objective    BP 96/62 (BP Location: Right arm)  Pulse 61  Ht 63\" (160 cm)  Wt 111 lb (50.3 kg)  BMI 19.66 kg/m2  Physical Exam   Constitutional: She is oriented to person, place, and time. She appears well-developed and well-nourished. She appears distressed.   AA   HENT:   Head: Normocephalic and atraumatic.   Eyes: EOM are normal. Pupils are equal, round, and reactive to light.   Neck: Normal range of motion.   Cardiovascular: Normal rate, regular rhythm and normal heart sounds.    Pulmonary/Chest: Effort normal and breath sounds normal.   Abdominal: Soft. Bowel sounds are normal. She exhibits no shifting dullness, no distension, no abdominal bruit, no ascites and no mass. There is no hepatosplenomegaly. There is tenderness. There is no rigidity, no rebound, no guarding and no CVA tenderness. No hernia. Hernia confirmed negative in the ventral area.   Musculoskeletal: Normal range of motion.   Neurological: She is alert and oriented to person, place, and time.   Skin: Skin is warm and dry.   Psychiatric: She has a normal mood and affect. Her behavior is normal. Judgment and thought content normal.   Nursing note and vitals reviewed.    Assessment/Plan      1. Chronic hepatitis C without hepatic coma    2. Other iron deficiency anemia    3. Weight loss, abnormal    4. Gastroesophageal reflux disease with esophagitis    5. Right upper quadrant abdominal pain    .   Sultana was seen today for heartburn, constipation, hepatitis c and " anemia.    Diagnoses and all orders for this visit:    Chronic hepatitis C without hepatic coma    Other iron deficiency anemia  -     Case Request; Standing  -     sodium chloride 0.9 % flush 1-10 mL; Infuse 1-10 mL into a venous catheter As Needed for Line Care.  -     dextrose 5 % and sodium chloride 0.45 % infusion; Infuse 30 mL/hr into a venous catheter Continuous As Needed (Start Prior to Procedure).  -     Case Request  -     Multiple Vitamins-Minerals (MULTIVITAMIN WITH MINERALS) tablet; Take 1 tablet by mouth Daily.    Weight loss, abnormal  -     Multiple Vitamins-Minerals (MULTIVITAMIN WITH MINERALS) tablet; Take 1 tablet by mouth Daily.    Gastroesophageal reflux disease with esophagitis    Right upper quadrant abdominal pain  -     Case Request; Standing  -     sodium chloride 0.9 % flush 1-10 mL; Infuse 1-10 mL into a venous catheter As Needed for Line Care.  -     dextrose 5 % and sodium chloride 0.45 % infusion; Infuse 30 mL/hr into a venous catheter Continuous As Needed (Start Prior to Procedure).  -     Case Request    Other orders  -     Obtain Informed Consent; Standing  -     POC Glucose Fingerstick; Standing  -     Insert Peripheral IV; Standing  -     Saline Lock & Maintain IV Access; Standing        Orders placed during this encounter include:  No orders of the defined types were placed in this encounter.      Medications prescribed:  New Medications Ordered This Visit   Medications   • Multiple Vitamins-Minerals (MULTIVITAMIN WITH MINERALS) tablet     Sig: Take 1 tablet by mouth Daily.     Dispense:  30 tablet     Refill:  11     Discontinued Medications       Reason for Discontinue    sulfamethoxazole-trimethoprim (BACTRIM DS,SEPTRA DS) 800-160 MG per tablet Therapy completed        Requested Prescriptions     Signed Prescriptions Disp Refills   • Multiple Vitamins-Minerals (MULTIVITAMIN WITH MINERALS) tablet 30 tablet 11     Sig: Take 1 tablet by mouth Daily.       Review and/or summary of  lab tests, radiology, procedures, medications. Review and summary of old records and obtaining of history. The risks and benefits of my recommendations, as well as other treatment options were discussed with the patient today. Questions were answered.    Follow-up: Return in about 4 weeks (around 9/12/2017), or if symptoms worsen or fail to improve.     ESOPHAGOGASTRODUODENOSCOPY (N/A)      This document has been electronically signed by Diaz Evangelista PA-C on September 6, 2017 7:59 PM      Results for orders placed or performed in visit on 08/03/17   POCT urinalysis dipstick, multipro   Result Value Ref Range    Color Jovita Yellow, Straw, Dark Yellow, Jovita    Clarity, UA Slightly Cloudy (A) Clear    Glucose, UA Negative Negative, 1000 mg/dL (3+) mg/dL    Bilirubin Negative Negative    Ketones, UA Negative Negative    Specific Gravity  1.015 1.005 - 1.030    Blood, UA Negative Negative    pH, Urine 6.0 5.0 - 8.0    Protein, POC 3+ (A) Negative mg/dL    Urobilinogen, UA Normal Normal    Leukocytes Small (1+) (A) Negative    Nitrite, UA Negative Negative   CBC Auto Differential   Result Value Ref Range    WBC 4.32 3.20 - 9.80 10*3/mm3    RBC 4.15 3.77 - 5.16 10*6/mm3    Hemoglobin 12.6 12.0 - 15.5 g/dL    Hematocrit 37.2 35.0 - 45.0 %    MCV 89.6 80.0 - 98.0 fL    MCH 30.4 26.5 - 34.0 pg    MCHC 33.9 31.4 - 36.0 g/dL    RDW 14.6 (H) 11.5 - 14.5 %    RDW-SD 47.9 (H) 36.4 - 46.3 fl    MPV 12.8 (H) 8.0 - 12.0 fL    Platelets 149 (L) 150 - 450 10*3/mm3    Neutrophil % 33.9 (L) 37.0 - 80.0 %    Lymphocyte % 57.2 (H) 10.0 - 50.0 %    Monocyte % 6.3 0.0 - 12.0 %    Eosinophil % 2.1 0.0 - 7.0 %    Basophil % 0.5 0.0 - 2.0 %    Immature Grans % 0.0 0.0 - 0.5 %    Neutrophils, Absolute 1.47 (L) 2.00 - 8.60 10*3/mm3    Lymphocytes, Absolute 2.47 0.60 - 4.20 10*3/mm3    Monocytes, Absolute 0.27 0.00 - 0.90 10*3/mm3    Eosinophils, Absolute 0.09 0.00 - 0.70 10*3/mm3    Basophils, Absolute 0.02 0.00 - 0.20 10*3/mm3    Immature  Grans, Absolute 0.00 0.00 - 0.02 10*3/mm3   Iron Profile   Result Value Ref Range    Iron <10 (L) 37 - 170 mcg/dL    TIBC 295 265 - 497 mcg/dL    Iron Saturation  15 - 50 %   Manual Differential   Result Value Ref Range    Neutrophil % 27.0 (L) 37.0 - 80.0 %    Lymphocyte % 62.0 (H) 10.0 - 50.0 %    Monocyte % 4.0 0.0 - 12.0 %    Bands %  7.0 (H) 0.0 - 5.0 %    Neutrophils Absolute 1.47 (L) 2.00 - 8.60 10*3/mm3    Lymphocytes Absolute 2.68 0.60 - 4.20 10*3/mm3    Monocytes Absolute 0.17 0.00 - 0.90 10*3/mm3    Anisocytosis Slight/1+ None Seen    WBC Morphology Normal Normal    Platelet Estimate Decreased Normal   TSH   Result Value Ref Range    TSH 0.960 0.460 - 4.680 mIU/mL   Comprehensive metabolic panel   Result Value Ref Range    Glucose 65 60 - 100 mg/dL    BUN 16 7 - 21 mg/dL    Creatinine 0.76 0.50 - 1.00 mg/dL    Sodium 141 137 - 145 mmol/L    Potassium 3.9 3.5 - 5.1 mmol/L    Chloride 106 95 - 110 mmol/L    CO2 27.0 22.0 - 31.0 mmol/L    Calcium 9.6 8.4 - 10.2 mg/dL    Total Protein 6.8 6.3 - 8.6 g/dL    Albumin 4.00 3.40 - 4.80 g/dL    ALT (SGPT) 57 (H) 9 - 52 U/L    AST (SGOT) 38 (H) 14 - 36 U/L    Alkaline Phosphatase 67 38 - 126 U/L    Total Bilirubin 0.4 0.2 - 1.3 mg/dL    eGFR  African Amer 95 51 - 120 mL/min/1.73    Globulin 2.8 2.3 - 3.5 gm/dL    A/G Ratio 1.4 1.1 - 1.8 g/dL    BUN/Creatinine Ratio 21.1 7.0 - 25.0    Anion Gap 8.0 5.0 - 15.0 mmol/L   Results for orders placed or performed in visit on 05/22/17   HCV FibroSURE   Result Value Ref Range    Fibrosis Score 0.23 (H) 0.00 - 0.21    Fibrosis Stage F0-F1     Necroinflammat Activity Score 0.18 (H) 0.00 - 0.17    Necroinflammat Activity Grade A0-A1     Alpha 2-Macroglobulins, Qn 271 110 - 276 mg/dL    Haptoglobin 133 34 - 200 mg/dL    Apolipoprotein A-1 147 116 - 209 mg/dL    Total Bilirubin 0.3 0.0 - 1.2 mg/dL    GGT 26 0 - 60 IU/L    ALT (SGPT) 37 0 - 40 IU/L    HCV Qual Interp Comment     Fibrosis Scoring: Comment     Necroinflamm Activity  Scoring: Comment     Limitations: Comment     Comment Comment    CBC Auto Differential   Result Value Ref Range    WBC 5.00 3.20 - 9.80 10*3/mm3    RBC 4.24 3.77 - 5.16 10*6/mm3    Hemoglobin 12.4 12.0 - 15.5 g/dL    Hematocrit 37.1 35.0 - 45.0 %    MCV 87.5 80.0 - 98.0 fL    MCH 29.2 26.5 - 34.0 pg    MCHC 33.4 31.4 - 36.0 g/dL    RDW 15.7 (H) 11.5 - 14.5 %    RDW-SD 50.0 (H) 36.4 - 46.3 fl    MPV 11.8 8.0 - 12.0 fL    Platelets 168 150 - 450 10*3/mm3   Hepatitis C RNA, Quantitative, PCR (graph)   Result Value Ref Range    Hepatitis C Quantitation [id number] IU/mL    HCV log10 5.816 log10 IU/mL    Test Information Comment    Manual Differential   Result Value Ref Range    Neutrophil % 48.0 37.0 - 80.0 %    Lymphocyte % 45.0 10.0 - 50.0 %    Monocyte % 6.0 0.0 - 12.0 %    Basophil % 1.0 0.0 - 2.0 %    Neutrophils Absolute 2.40 2.00 - 8.60 10*3/mm3    Lymphocytes Absolute 2.25 0.60 - 4.20 10*3/mm3    Monocytes Absolute 0.30 0.00 - 0.90 10*3/mm3    Basophils Absolute 0.05 0.00 - 0.20 10*3/mm3    RBC Morphology Normal Normal    WBC Morphology Normal Normal    Platelet Estimate Adequate Normal     *Note: Due to a large number of results and/or encounters for the requested time period, some results have not been displayed. A complete set of results can be found in Results Review.       Some portions of this note have been dictated using voice recognition software and may contain errors and/or omissions.

## 2017-09-07 NOTE — PLAN OF CARE
Problem: GI Endoscopy (Adult)  Goal: Signs and Symptoms of Listed Potential Problems Will be Absent or Manageable (GI Endoscopy)  Outcome: Ongoing (interventions implemented as appropriate)    09/07/17 1426   GI Endoscopy   Problems Assessed (GI Endoscopy) all   Problems Present (GI Endoscopy) none

## 2017-09-07 NOTE — ANESTHESIA PREPROCEDURE EVALUATION
Anesthesia Evaluation     NPO Solid Status: > 8 hours  NPO Liquid Status: > 8 hours     Airway   Mallampati: II  TM distance: >3 FB  Neck ROM: full  no difficulty expected  Dental - normal exam     Pulmonary - normal exam   (+) asthma,   Cardiovascular     (+) hypertension,       Neuro/Psych  (+) psychiatric history Depression,    GI/Hepatic/Renal/Endo      Musculoskeletal     (+) neck pain,   Abdominal    Substance History      OB/GYN          Other   (+) arthritis                                   Anesthesia Plan    ASA 3     MAC     intravenous induction   Anesthetic plan and risks discussed with patient.

## 2017-09-07 NOTE — ANESTHESIA POSTPROCEDURE EVALUATION
Patient: Sultana Saldana    Procedure Summary     Date Anesthesia Start Anesthesia Stop Room / Location    09/07/17 1419 1427 [facility] ENDOSCOPY 3 / [facility] ENDOSCOPY       Procedure Diagnosis Surgeon Provider    ESOPHAGOGASTRODUODENOSCOPY (N/A Esophagus) Right upper quadrant abdominal pain; Other iron deficiency anemia  (Right upper quadrant abdominal pain [R10.11]; Other iron deficiency anemia [D50.8]) MD Alex Ramos CRNA          Anesthesia Type: MAC  Last vitals  BP   122/66 (09/07/17 1405)    Temp   98.8 °F (37.1 °C) (09/07/17 1405)    Pulse   59 (09/07/17 1405)   Resp   18 (09/07/17 1405)    SpO2   95 % (09/07/17 1405)      Post Anesthesia Care and Evaluation    Patient location during evaluation: bedside  Patient participation: waiting for patient participation  Level of consciousness: responsive to verbal stimuli  Pain management: adequate  Airway patency: patent  Anesthetic complications: No anesthetic complications  PONV Status: none  Cardiovascular status: acceptable  Respiratory status: acceptable  Hydration status: acceptable

## 2017-09-11 LAB
LAB AP CASE REPORT: NORMAL
Lab: NORMAL
PATH REPORT.FINAL DX SPEC: NORMAL
PATH REPORT.GROSS SPEC: NORMAL

## 2017-10-17 ENCOUNTER — OFFICE VISIT (OUTPATIENT)
Dept: FAMILY MEDICINE CLINIC | Facility: CLINIC | Age: 57
End: 2017-10-17

## 2017-10-17 VITALS
SYSTOLIC BLOOD PRESSURE: 99 MMHG | BODY MASS INDEX: 18.44 KG/M2 | RESPIRATION RATE: 16 BRPM | DIASTOLIC BLOOD PRESSURE: 52 MMHG | OXYGEN SATURATION: 97 % | TEMPERATURE: 98.6 F | HEART RATE: 110 BPM | HEIGHT: 64 IN | WEIGHT: 108 LBS

## 2017-10-17 DIAGNOSIS — M54.2 NECK PAIN: Primary | ICD-10-CM

## 2017-10-17 PROCEDURE — 99213 OFFICE O/P EST LOW 20 MIN: CPT | Performed by: NURSE PRACTITIONER

## 2017-10-17 RX ORDER — METHYLPREDNISOLONE 4 MG/1
TABLET ORAL
Qty: 21 TABLET | Refills: 0 | Status: SHIPPED | OUTPATIENT
Start: 2017-10-17 | End: 2018-02-15

## 2017-10-17 RX ORDER — METHOCARBAMOL 500 MG/1
500 TABLET, FILM COATED ORAL 4 TIMES DAILY
Qty: 120 TABLET | Refills: 1 | Status: SHIPPED | OUTPATIENT
Start: 2017-10-17 | End: 2018-02-15

## 2017-10-17 RX ORDER — DICLOFENAC SODIUM 75 MG/1
75 TABLET, DELAYED RELEASE ORAL 2 TIMES DAILY
Qty: 60 TABLET | Refills: 3 | Status: SHIPPED | OUTPATIENT
Start: 2017-10-17 | End: 2018-02-15

## 2017-10-17 NOTE — PROGRESS NOTES
Subjective   Sultana Paolo Saldana is a 56 y.o. female.     HPI Comments: Here today with c/o neck pain.  Has been going on for a while, but she feels the meds she is presently taking do not help.    Neck Pain    This is a recurrent problem. The current episode started more than 1 month ago. The problem occurs constantly. The problem has been unchanged. The pain is associated with nothing. The pain is present in the anterior neck. The quality of the pain is described as aching. The pain is at a severity of 10/10. The pain is severe. The symptoms are aggravated by twisting and position. The pain is same all the time. Stiffness is present in the morning. Pertinent negatives include no chest pain, fever, headaches, leg pain, numbness, pain with swallowing, paresis, photophobia, syncope, trouble swallowing, visual change, weakness or weight loss. She has tried nothing for the symptoms. The treatment provided no relief.        The following portions of the patient's history were reviewed and updated as appropriate: allergies, current medications, past family history, past medical history, past social history, past surgical history and problem list.    Review of Systems   Constitutional: Negative.  Negative for fever and weight loss.   HENT: Negative.  Negative for trouble swallowing.    Eyes: Negative for photophobia.   Respiratory: Negative.    Cardiovascular: Negative.  Negative for chest pain and syncope.   Musculoskeletal: Positive for neck pain.   Skin: Negative.    Neurological: Negative.  Negative for weakness, numbness and headaches.   Psychiatric/Behavioral: Negative.        Objective   Physical Exam   Constitutional: She is oriented to person, place, and time. She appears well-developed and well-nourished. No distress.   HENT:   Head: Normocephalic.   Eyes: EOM are normal. Pupils are equal, round, and reactive to light.   Neck: Normal range of motion. Neck supple. No thyromegaly present.   Cardiovascular: Normal  rate, regular rhythm and normal heart sounds.  Exam reveals no friction rub.    No murmur heard.  Pulmonary/Chest: Effort normal and breath sounds normal. No respiratory distress. She has no wheezes. She has no rales.   Abdominal: Soft.   Musculoskeletal: Normal range of motion.        Cervical back: She exhibits tenderness, pain and spasm. She exhibits normal range of motion.   X ray of the cervical spine shows mild degenerative changes.   Neurological: She is alert and oriented to person, place, and time.   Skin: Skin is warm and dry.   Psychiatric: She has a normal mood and affect. Thought content normal.   Nursing note and vitals reviewed.      Assessment/Plan   Sultana was seen today for weight loss.    Diagnoses and all orders for this visit:    Neck pain  -     XR Spine Cervical Complete 4 or 5 View (In Office)  -     methocarbamol (ROBAXIN) 500 MG tablet; Take 1 tablet by mouth 4 (Four) Times a Day.  -     diclofenac (VOLTAREN) 75 MG EC tablet; Take 1 tablet by mouth 2 (Two) Times a Day.  -     MethylPREDNISolone (MEDROL, TERRY,) 4 MG tablet; Take as directed on package instructions.

## 2017-10-23 RX ORDER — LISINOPRIL AND HYDROCHLOROTHIAZIDE 12.5; 1 MG/1; MG/1
TABLET ORAL
Qty: 30 TABLET | Refills: 2 | Status: SHIPPED | OUTPATIENT
Start: 2017-10-23 | End: 2018-02-15 | Stop reason: SDUPTHER

## 2017-12-20 DIAGNOSIS — K59.00 CONSTIPATION, UNSPECIFIED CONSTIPATION TYPE: ICD-10-CM

## 2017-12-20 RX ORDER — SULINDAC 200 MG/1
TABLET ORAL
Qty: 60 TABLET | Refills: 0 | Status: SHIPPED | OUTPATIENT
Start: 2017-12-20 | End: 2018-02-15

## 2017-12-20 RX ORDER — LINACLOTIDE 145 UG/1
CAPSULE, GELATIN COATED ORAL
Qty: 30 CAPSULE | Refills: 0 | Status: SHIPPED | OUTPATIENT
Start: 2017-12-20 | End: 2018-02-15 | Stop reason: SDUPTHER

## 2017-12-21 ENCOUNTER — TELEPHONE (OUTPATIENT)
Dept: FAMILY MEDICINE CLINIC | Facility: CLINIC | Age: 57
End: 2017-12-21

## 2018-01-08 RX ORDER — POTASSIUM CHLORIDE 20MEQ/15ML
LIQUID (ML) ORAL
Qty: 450 ML | Refills: 3 | Status: SHIPPED | OUTPATIENT
Start: 2018-01-08 | End: 2018-02-15 | Stop reason: SDUPTHER

## 2018-02-14 ENCOUNTER — TELEPHONE (OUTPATIENT)
Dept: GASTROENTEROLOGY | Facility: CLINIC | Age: 58
End: 2018-02-14

## 2018-02-14 NOTE — TELEPHONE ENCOUNTER
Patients telephone call has been returned. Patient is now living in [place] and was confused and wanted to know if she did have Hep C. Her questions were answered. Her new physician that she is going to follow in [place] has already received her medical records.

## 2018-02-15 ENCOUNTER — OFFICE VISIT (OUTPATIENT)
Dept: INTERNAL MEDICINE | Facility: CLINIC | Age: 58
End: 2018-02-15

## 2018-02-15 VITALS — OXYGEN SATURATION: 99 % | WEIGHT: 112.1 LBS | HEART RATE: 68 BPM | HEIGHT: 64 IN | BODY MASS INDEX: 19.14 KG/M2

## 2018-02-15 DIAGNOSIS — F33.2 SEVERE EPISODE OF RECURRENT MAJOR DEPRESSIVE DISORDER, WITHOUT PSYCHOTIC FEATURES (HCC): ICD-10-CM

## 2018-02-15 DIAGNOSIS — G89.29 CHRONIC RIGHT SHOULDER PAIN: ICD-10-CM

## 2018-02-15 DIAGNOSIS — R30.0 DYSURIA: Primary | ICD-10-CM

## 2018-02-15 DIAGNOSIS — N30.00 ACUTE CYSTITIS WITHOUT HEMATURIA: ICD-10-CM

## 2018-02-15 DIAGNOSIS — M25.511 RIGHT SHOULDER PAIN, UNSPECIFIED CHRONICITY: ICD-10-CM

## 2018-02-15 DIAGNOSIS — J45.50 SEVERE PERSISTENT ASTHMA WITHOUT COMPLICATION: ICD-10-CM

## 2018-02-15 DIAGNOSIS — E87.6 HYPOKALEMIA: ICD-10-CM

## 2018-02-15 DIAGNOSIS — R73.9 HYPERGLYCEMIA: ICD-10-CM

## 2018-02-15 DIAGNOSIS — M54.2 NECK PAIN: ICD-10-CM

## 2018-02-15 DIAGNOSIS — K59.00 CONSTIPATION, UNSPECIFIED CONSTIPATION TYPE: ICD-10-CM

## 2018-02-15 DIAGNOSIS — N95.1 POST MENOPAUSAL SYNDROME: ICD-10-CM

## 2018-02-15 DIAGNOSIS — F41.9 ANXIETY: ICD-10-CM

## 2018-02-15 DIAGNOSIS — Z91.09 ENVIRONMENTAL ALLERGIES: ICD-10-CM

## 2018-02-15 DIAGNOSIS — D50.8 OTHER IRON DEFICIENCY ANEMIA: ICD-10-CM

## 2018-02-15 DIAGNOSIS — F31.9 BIPOLAR DISEASE, CHRONIC (HCC): ICD-10-CM

## 2018-02-15 DIAGNOSIS — G40.909 SEIZURE DISORDER (HCC): ICD-10-CM

## 2018-02-15 DIAGNOSIS — M25.511 CHRONIC RIGHT SHOULDER PAIN: ICD-10-CM

## 2018-02-15 PROBLEM — B18.2 HEP C W/O COMA, CHRONIC: Status: ACTIVE | Noted: 2018-02-15

## 2018-02-15 LAB
ALBUMIN SERPL-MCNC: 4.2 G/DL (ref 3.2–4.8)
ALBUMIN/GLOB SERPL: 1.6 G/DL (ref 1.5–2.5)
ALP SERPL-CCNC: 82 U/L (ref 25–100)
ALT SERPL W P-5'-P-CCNC: 61 U/L (ref 7–40)
ANION GAP SERPL CALCULATED.3IONS-SCNC: 3 MMOL/L (ref 3–11)
AST SERPL-CCNC: 52 U/L (ref 0–33)
BILIRUB BLD-MCNC: NEGATIVE MG/DL
BILIRUB SERPL-MCNC: 0.4 MG/DL (ref 0.3–1.2)
BUN BLD-MCNC: 20 MG/DL (ref 9–23)
BUN/CREAT SERPL: 25 (ref 7–25)
CALCIUM SPEC-SCNC: 9.5 MG/DL (ref 8.7–10.4)
CHLORIDE SERPL-SCNC: 107 MMOL/L (ref 99–109)
CLARITY, POC: CLEAR
CO2 SERPL-SCNC: 29 MMOL/L (ref 20–31)
COLOR UR: YELLOW
CREAT BLD-MCNC: 0.8 MG/DL (ref 0.6–1.3)
GFR SERPL CREATININE-BSD FRML MDRD: 89 ML/MIN/1.73
GLOBULIN UR ELPH-MCNC: 2.7 GM/DL
GLUCOSE BLD-MCNC: 112 MG/DL (ref 70–100)
GLUCOSE BLDC GLUCOMTR-MCNC: 126 MG/DL (ref 70–130)
GLUCOSE UR STRIP-MCNC: NEGATIVE MG/DL
KETONES UR QL: NEGATIVE
LEUKOCYTE EST, POC: ABNORMAL
NITRITE UR-MCNC: NEGATIVE MG/ML
PH UR: 5 [PH] (ref 5–8)
POTASSIUM BLD-SCNC: 3.5 MMOL/L (ref 3.5–5.5)
PROT SERPL-MCNC: 6.9 G/DL (ref 5.7–8.2)
PROT UR STRIP-MCNC: NEGATIVE MG/DL
RBC # UR STRIP: NEGATIVE /UL
SODIUM BLD-SCNC: 139 MMOL/L (ref 132–146)
SP GR UR: 1.01 (ref 1–1.03)
UROBILINOGEN UR QL: NORMAL

## 2018-02-15 PROCEDURE — 82947 ASSAY GLUCOSE BLOOD QUANT: CPT | Performed by: NURSE PRACTITIONER

## 2018-02-15 PROCEDURE — 80053 COMPREHEN METABOLIC PANEL: CPT | Performed by: NURSE PRACTITIONER

## 2018-02-15 PROCEDURE — 99215 OFFICE O/P EST HI 40 MIN: CPT | Performed by: NURSE PRACTITIONER

## 2018-02-15 RX ORDER — POTASSIUM CHLORIDE 20MEQ/15ML
20 LIQUID (ML) ORAL DAILY
Qty: 450 ML | Refills: 3 | Status: SHIPPED | OUTPATIENT
Start: 2018-02-15

## 2018-02-15 RX ORDER — ALBUTEROL SULFATE 2.5 MG/3ML
2.5 SOLUTION RESPIRATORY (INHALATION)
Qty: 60 VIAL | Refills: 5 | Status: SHIPPED | OUTPATIENT
Start: 2018-02-15 | End: 2022-02-24

## 2018-02-15 RX ORDER — FERROUS SULFATE 325(65) MG
325 TABLET ORAL
Qty: 60 TABLET | Refills: 3 | Status: SHIPPED | OUTPATIENT
Start: 2018-02-15 | End: 2022-02-24

## 2018-02-15 RX ORDER — SERTRALINE HYDROCHLORIDE 25 MG/1
TABLET, FILM COATED ORAL
COMMUNITY
Start: 2018-01-27 | End: 2018-02-15

## 2018-02-15 RX ORDER — OXCARBAZEPINE 300 MG/1
300 TABLET, FILM COATED ORAL EVERY 12 HOURS SCHEDULED
Qty: 60 TABLET | Refills: 3 | Status: SHIPPED | OUTPATIENT
Start: 2018-02-15

## 2018-02-15 RX ORDER — ALBUTEROL SULFATE 2.5 MG/3ML
2.5 SOLUTION RESPIRATORY (INHALATION)
Qty: 60 VIAL | Refills: 5 | Status: SHIPPED | OUTPATIENT
Start: 2018-02-15 | End: 2018-02-15 | Stop reason: SDUPTHER

## 2018-02-15 RX ORDER — LISINOPRIL AND HYDROCHLOROTHIAZIDE 12.5; 1 MG/1; MG/1
1 TABLET ORAL DAILY
Qty: 30 TABLET | Refills: 2 | Status: SHIPPED | OUTPATIENT
Start: 2018-02-15 | End: 2018-06-23 | Stop reason: SDUPTHER

## 2018-02-15 RX ORDER — OLANZAPINE 10 MG/1
10 TABLET ORAL NIGHTLY
Qty: 30 TABLET | Refills: 5 | Status: SHIPPED | OUTPATIENT
Start: 2018-02-15 | End: 2022-02-24

## 2018-02-15 RX ORDER — NITROFURANTOIN 25; 75 MG/1; MG/1
100 CAPSULE ORAL 2 TIMES DAILY
Qty: 14 CAPSULE | Refills: 0 | Status: SHIPPED | OUTPATIENT
Start: 2018-02-15 | End: 2018-02-22

## 2018-02-15 RX ORDER — LORATADINE 10 MG/1
10 TABLET ORAL DAILY
Qty: 30 TABLET | Refills: 5 | Status: SHIPPED | OUTPATIENT
Start: 2018-02-15

## 2018-02-15 RX ORDER — MULTIVITAMIN WITH FOLIC ACID 400 MCG
TABLET ORAL
COMMUNITY
Start: 2018-01-27

## 2018-02-15 RX ORDER — ALBUTEROL SULFATE 90 UG/1
2 AEROSOL, METERED RESPIRATORY (INHALATION) EVERY 4 HOURS PRN
Qty: 1 INHALER | Refills: 5 | Status: SHIPPED | OUTPATIENT
Start: 2018-02-15

## 2018-02-15 RX ORDER — CITALOPRAM 10 MG/1
10 TABLET ORAL DAILY
Qty: 30 TABLET | Refills: 6 | Status: SHIPPED | OUTPATIENT
Start: 2018-02-15 | End: 2022-02-24

## 2018-02-15 RX ORDER — MELOXICAM 15 MG/1
15 TABLET ORAL DAILY
Qty: 30 TABLET | Refills: 5 | Status: SHIPPED | OUTPATIENT
Start: 2018-02-15

## 2018-02-15 NOTE — PROGRESS NOTES
Subjective  Establish Care and Shoulder Pain (Right side)      Sultana Saldana is a 57 y.o. female.   Allergies   Allergen Reactions   • Diphenhydramine Swelling     History of Present Illness      New pt, hx seizures, last seizure was last week until today , gets excited in [place] and has a seizure , states if she does not calm self down she has anxiety attack and has a seizure   Has been out of meds x 6 months   Pt c/o horrible, anxiety , depression, does see psych but would like meds handled by pcp , she does use her proventil nebs twice daily and these keep her wheezing and soa stable     She has been out of the linzess so has been dealing with constipation once she is back on the medication she states her problem will be resolved     Right neck and shoulder pain x many months but worsening over past month, worse with movement , has tried diclofenac and robaxin w/o relief     Pt does state she has hep c but sees hepatology for this     C/o burning frequency with urination x 2 weeks, some urgency,   denies fever/chills   Does take estrogen for her hot flashes that have been persistent for the past months she has been out of meds, reports no problems or sx when on this medication   The following portions of the patient's history were reviewed and updated as appropriate: past social history, past surgical history and problem list.    Review of Systems   Respiratory: Positive for wheezing.    Gastrointestinal: Positive for constipation.   Endocrine:        Hot flashes   Genitourinary: Positive for dysuria and frequency.   Musculoskeletal: Positive for arthralgias.   Psychiatric/Behavioral: Positive for agitation and dysphoric mood. The patient is nervous/anxious.    All other systems reviewed and are negative.      Objective   Physical Exam   Constitutional: She is oriented to person, place, and time. She appears well-developed and well-nourished.   HENT:   Head: Normocephalic and atraumatic.   Right Ear: External  ear normal.   Left Ear: External ear normal.   Mouth/Throat: Oropharynx is clear and moist.   Eyes: Conjunctivae are normal.   Neck: Neck supple. No thyromegaly present.   Cardiovascular: Normal rate and regular rhythm.    Pulmonary/Chest: Effort normal and breath sounds normal.   Abdominal: Soft. Bowel sounds are normal.   Musculoskeletal: She exhibits tenderness.        Right shoulder: She exhibits tenderness and pain.        Cervical back: She exhibits decreased range of motion, tenderness and pain.   Lymphadenopathy:     She has no cervical adenopathy.   Neurological: She is alert and oriented to person, place, and time.   Skin: Skin is warm and dry.   Psychiatric: Her mood appears anxious. Her speech is rapid and/or pressured. She is hyperactive. She expresses impulsivity. She is inattentive.   Nursing note and vitals reviewed.      Assessment/Plan     Problem List Items Addressed This Visit        Respiratory    Uncomplicated asthma    Relevant Medications    albuterol (VENTOLIN HFA) 108 (90 Base) MCG/ACT inhaler    fluticasone-salmeterol (ADVAIR DISKUS) 250-50 MCG/DOSE DISKUS    albuterol (PROVENTIL) (2.5 MG/3ML) 0.083% nebulizer solution       Digestive    Constipation    Relevant Medications    linaclotide (LINZESS) 145 MCG capsule capsule       Nervous and Auditory    Right shoulder pain    Relevant Medications    meloxicam (MOBIC) 15 MG tablet    Dysuria - Primary    Relevant Orders    POC Urinalysis Dipstick, Automated (Completed)    Comprehensive Metabolic Panel    Neck pain       Genitourinary    Urinary tract infection without hematuria    Relevant Medications    nitrofurantoin, macrocrystal-monohydrate, (MACROBID) 100 MG capsule       Hematopoietic and Hemostatic    Iron deficiency anemia    Relevant Medications    ferrous sulfate 325 (65 FE) MG tablet    Other Relevant Orders    Comprehensive Metabolic Panel      Other Visit Diagnoses     Hyperglycemia        Relevant Orders    POCT Glucose  (Completed)    Comprehensive Metabolic Panel    Seizure disorder        Relevant Medications    OXcarbazepine (TRILEPTAL) 300 MG tablet    Severe episode of recurrent major depressive disorder, without psychotic features        Relevant Medications    citalopram (CeleXA) 10 MG tablet    OLANZapine (zyPREXA) 10 MG tablet    Anxiety        Relevant Medications    citalopram (CeleXA) 10 MG tablet    Post menopausal syndrome        Relevant Medications    estrogen, conjugated,-medroxyprogesterone (PREMPRO) 0.3-1.5 MG per tablet    Environmental allergies        Relevant Medications    loratadine (CLARITIN) 10 MG tablet    Bipolar disease, chronic        Relevant Medications    citalopram (CeleXA) 10 MG tablet    OLANZapine (zyPREXA) 10 MG tablet    Hypokalemia        Relevant Medications    potassium chloride (KAYCIEL) 20 MEQ/15ML (10%) solution    Other Relevant Orders    Comprehensive Metabolic Panel        Results for orders placed or performed in visit on 02/15/18   POC Urinalysis Dipstick, Automated   Result Value Ref Range    Color Yellow Yellow, Straw, Dark Yellow, Jovita    Clarity, UA Clear Clear    Glucose, UA Negative Negative, 1000 mg/dL (3+) mg/dL    Bilirubin Negative Negative    Ketones, UA Negative Negative    Specific Gravity  1.015 1.005 - 1.030    Blood, UA Negative Negative    pH, Urine 5.0 5.0 - 8.0    Protein, POC Negative Negative mg/dL    Urobilinogen, UA Normal Normal    Leukocytes 500 Daria/ul (A) Negative    Nitrite, UA Negative Negative   POCT Glucose   Result Value Ref Range    Glucose 126 70 - 130 mg/dL       Will review meds and call pt with poc , rtc 1 month to reassess, pt does not drive, she is to keep all appts here and with psych

## 2018-03-30 ENCOUNTER — OFFICE VISIT (OUTPATIENT)
Dept: INTERNAL MEDICINE | Facility: CLINIC | Age: 58
End: 2018-03-30

## 2018-03-30 VITALS
SYSTOLIC BLOOD PRESSURE: 106 MMHG | WEIGHT: 125 LBS | OXYGEN SATURATION: 94 % | HEIGHT: 63 IN | HEART RATE: 50 BPM | BODY MASS INDEX: 22.15 KG/M2 | DIASTOLIC BLOOD PRESSURE: 76 MMHG

## 2018-03-30 DIAGNOSIS — G89.29 CHRONIC PAIN OF BOTH SHOULDERS: Primary | ICD-10-CM

## 2018-03-30 DIAGNOSIS — M25.511 CHRONIC PAIN OF BOTH SHOULDERS: Primary | ICD-10-CM

## 2018-03-30 DIAGNOSIS — M25.512 CHRONIC PAIN OF BOTH SHOULDERS: Primary | ICD-10-CM

## 2018-03-30 PROCEDURE — 99213 OFFICE O/P EST LOW 20 MIN: CPT | Performed by: NURSE PRACTITIONER

## 2018-03-30 NOTE — PROGRESS NOTES
"Subjective  Shoulder Pain      Sultana Saldana is a 57 y.o. female.   Allergies   Allergen Reactions   • Diphenhydramine Swelling     History of Present Illness      Pain in shoulders is keeping her awake at night, has been doing home exercises w/o much relief , has had this for many years , does continue meloxicam   The following portions of the patient's history were reviewed and updated as appropriate: allergies, current medications, past social history and problem list.    Review of Systems   Musculoskeletal: Positive for arthralgias.   All other systems reviewed and are negative.      Objective   Physical Exam   Constitutional: She is oriented to person, place, and time. She appears well-developed and well-nourished.   HENT:   Head: Normocephalic and atraumatic.   Eyes: Conjunctivae are normal.   Cardiovascular: Normal rate and regular rhythm.    Pulmonary/Chest: Effort normal and breath sounds normal.   Musculoskeletal:        Right shoulder: She exhibits decreased range of motion, tenderness and pain.        Left shoulder: She exhibits decreased range of motion, tenderness, bony tenderness and pain.   Neurological: She is alert and oriented to person, place, and time.   Skin: Skin is warm and dry.   Psychiatric: She has a normal mood and affect. Her behavior is normal. Judgment and thought content normal.   Nursing note and vitals reviewed.    /76   Pulse 50   Ht 160 cm (63\")   Wt 56.7 kg (125 lb)   SpO2 94%   BMI 22.14 kg/m²     Assessment/Plan     Problem List Items Addressed This Visit     None      Visit Diagnoses     Chronic pain of both shoulders    -  Primary        Order given for PT       "

## 2018-06-23 RX ORDER — LISINOPRIL AND HYDROCHLOROTHIAZIDE 12.5; 1 MG/1; MG/1
TABLET ORAL
Qty: 30 TABLET | Refills: 2 | Status: SHIPPED | OUTPATIENT
Start: 2018-06-23

## 2018-12-06 ENCOUNTER — TELEPHONE (OUTPATIENT)
Dept: INTERNAL MEDICINE | Facility: CLINIC | Age: 58
End: 2018-12-06

## 2020-05-21 DIAGNOSIS — G40.909 SEIZURE DISORDER (HCC): ICD-10-CM

## 2020-05-22 RX ORDER — OXCARBAZEPINE 300 MG/1
TABLET, FILM COATED ORAL
Qty: 60 TABLET | Refills: 0 | OUTPATIENT
Start: 2020-05-22

## 2022-02-24 ENCOUNTER — OFFICE VISIT (OUTPATIENT)
Dept: FAMILY MEDICINE CLINIC | Facility: CLINIC | Age: 62
End: 2022-02-24

## 2022-02-24 ENCOUNTER — LAB (OUTPATIENT)
Dept: LAB | Facility: HOSPITAL | Age: 62
End: 2022-02-24

## 2022-02-24 VITALS
SYSTOLIC BLOOD PRESSURE: 138 MMHG | WEIGHT: 162.2 LBS | DIASTOLIC BLOOD PRESSURE: 86 MMHG | TEMPERATURE: 98.6 F | OXYGEN SATURATION: 97 % | BODY MASS INDEX: 27.69 KG/M2 | HEART RATE: 96 BPM | HEIGHT: 64 IN

## 2022-02-24 DIAGNOSIS — N30.00 ACUTE CYSTITIS WITHOUT HEMATURIA: Primary | ICD-10-CM

## 2022-02-24 DIAGNOSIS — G40.909 NONINTRACTABLE EPILEPSY WITHOUT STATUS EPILEPTICUS, UNSPECIFIED EPILEPSY TYPE: ICD-10-CM

## 2022-02-24 DIAGNOSIS — F14.10 COCAINE USE DISORDER: ICD-10-CM

## 2022-02-24 DIAGNOSIS — Z11.59 NEED FOR HEPATITIS C SCREENING TEST: ICD-10-CM

## 2022-02-24 DIAGNOSIS — F31.9 BIPOLAR AFFECTIVE DISORDER, REMISSION STATUS UNSPECIFIED: ICD-10-CM

## 2022-02-24 DIAGNOSIS — Z13.220 LIPID SCREENING: ICD-10-CM

## 2022-02-24 PROBLEM — F14.11 NONDEPENDENT COCAINE ABUSE IN REMISSION: Status: ACTIVE | Noted: 2021-09-14

## 2022-02-24 PROBLEM — I10 ESSENTIAL HYPERTENSION: Status: ACTIVE | Noted: 2018-05-08

## 2022-02-24 PROBLEM — F41.9 ANXIETY: Status: ACTIVE | Noted: 2020-08-25

## 2022-02-24 PROBLEM — M19.011 ARTHRITIS OF RIGHT GLENOHUMERAL JOINT: Status: ACTIVE | Noted: 2018-07-20

## 2022-02-24 PROBLEM — M54.9 PAIN OF BACK AND LEFT LOWER EXTREMITY: Status: ACTIVE | Noted: 2020-06-08

## 2022-02-24 PROBLEM — K64.9 HEMORRHOIDS: Status: ACTIVE | Noted: 2022-02-24

## 2022-02-24 PROBLEM — J30.9 ALLERGIC RHINITIS: Status: ACTIVE | Noted: 2018-09-19

## 2022-02-24 PROBLEM — K92.1 HEMATOCHEZIA: Status: ACTIVE | Noted: 2022-02-24

## 2022-02-24 PROBLEM — M54.30 SCIATICA: Status: ACTIVE | Noted: 2020-06-08

## 2022-02-24 PROBLEM — M79.605 PAIN OF BACK AND LEFT LOWER EXTREMITY: Status: ACTIVE | Noted: 2020-06-08

## 2022-02-24 LAB
ALBUMIN SERPL-MCNC: 4.4 G/DL (ref 3.5–5.2)
ALBUMIN/GLOB SERPL: 1.5 G/DL
ALP SERPL-CCNC: 80 U/L (ref 39–117)
ALT SERPL W P-5'-P-CCNC: 12 U/L (ref 1–33)
ANION GAP SERPL CALCULATED.3IONS-SCNC: 10.6 MMOL/L (ref 5–15)
AST SERPL-CCNC: 20 U/L (ref 1–32)
BASOPHILS # BLD MANUAL: 0.06 10*3/MM3 (ref 0–0.2)
BASOPHILS NFR BLD MANUAL: 1 % (ref 0–1.5)
BILIRUB BLD-MCNC: NEGATIVE MG/DL
BILIRUB SERPL-MCNC: 0.2 MG/DL (ref 0–1.2)
BUN SERPL-MCNC: 16 MG/DL (ref 8–23)
BUN/CREAT SERPL: 18.2 (ref 7–25)
CALCIUM SPEC-SCNC: 9.9 MG/DL (ref 8.6–10.5)
CHLORIDE SERPL-SCNC: 105 MMOL/L (ref 98–107)
CHOLEST SERPL-MCNC: 192 MG/DL (ref 0–200)
CLARITY, POC: CLEAR
CO2 SERPL-SCNC: 23.4 MMOL/L (ref 22–29)
COLOR UR: ABNORMAL
CREAT SERPL-MCNC: 0.88 MG/DL (ref 0.57–1)
DEPRECATED RDW RBC AUTO: 48 FL (ref 37–54)
EOSINOPHIL # BLD MANUAL: 0.06 10*3/MM3 (ref 0–0.4)
EOSINOPHIL NFR BLD MANUAL: 1 % (ref 0.3–6.2)
ERYTHROCYTE [DISTWIDTH] IN BLOOD BY AUTOMATED COUNT: 14.8 % (ref 12.3–15.4)
EXPIRATION DATE: ABNORMAL
GFR SERPL CREATININE-BSD FRML MDRD: 79 ML/MIN/1.73
GLOBULIN UR ELPH-MCNC: 2.9 GM/DL
GLUCOSE SERPL-MCNC: 98 MG/DL (ref 65–99)
GLUCOSE UR STRIP-MCNC: NEGATIVE MG/DL
HCT VFR BLD AUTO: 41.8 % (ref 34–46.6)
HDLC SERPL-MCNC: 50 MG/DL (ref 40–60)
HGB BLD-MCNC: 13.6 G/DL (ref 12–15.9)
KETONES UR QL: NEGATIVE
LDLC SERPL CALC-MCNC: 128 MG/DL (ref 0–100)
LDLC/HDLC SERPL: 2.52 {RATIO}
LEUKOCYTE EST, POC: ABNORMAL
LYMPHOCYTES # BLD MANUAL: 3.66 10*3/MM3 (ref 0.7–3.1)
LYMPHOCYTES NFR BLD MANUAL: 5.1 % (ref 5–12)
Lab: ABNORMAL
MCH RBC QN AUTO: 29.1 PG (ref 26.6–33)
MCHC RBC AUTO-ENTMCNC: 32.5 G/DL (ref 31.5–35.7)
MCV RBC AUTO: 89.3 FL (ref 79–97)
MONOCYTES # BLD: 0.29 10*3/MM3 (ref 0.1–0.9)
NEUTROPHILS # BLD AUTO: 1.55 10*3/MM3 (ref 1.7–7)
NEUTROPHILS NFR BLD MANUAL: 27.6 % (ref 42.7–76)
NITRITE UR-MCNC: POSITIVE MG/ML
NRBC BLD AUTO-RTO: 0 /100 WBC (ref 0–0.2)
PH UR: 5.5 [PH] (ref 5–8)
PLAT MORPH BLD: NORMAL
PLATELET # BLD AUTO: 203 10*3/MM3 (ref 140–450)
PMV BLD AUTO: 12 FL (ref 6–12)
POTASSIUM SERPL-SCNC: 3.3 MMOL/L (ref 3.5–5.2)
PROT SERPL-MCNC: 7.3 G/DL (ref 6–8.5)
PROT UR STRIP-MCNC: ABNORMAL MG/DL
RBC # BLD AUTO: 4.68 10*6/MM3 (ref 3.77–5.28)
RBC # UR STRIP: NEGATIVE /UL
RBC MORPH BLD: NORMAL
SODIUM SERPL-SCNC: 139 MMOL/L (ref 136–145)
SP GR UR: 1.03 (ref 1–1.03)
TRIGL SERPL-MCNC: 79 MG/DL (ref 0–150)
UROBILINOGEN UR QL: NORMAL
VARIANT LYMPHS NFR BLD MANUAL: 65.3 % (ref 19.6–45.3)
VLDLC SERPL-MCNC: 14 MG/DL (ref 5–40)
WBC MORPH BLD: NORMAL
WBC NRBC COR # BLD: 5.61 10*3/MM3 (ref 3.4–10.8)

## 2022-02-24 PROCEDURE — 86803 HEPATITIS C AB TEST: CPT | Performed by: STUDENT IN AN ORGANIZED HEALTH CARE EDUCATION/TRAINING PROGRAM

## 2022-02-24 PROCEDURE — 85007 BL SMEAR W/DIFF WBC COUNT: CPT | Performed by: STUDENT IN AN ORGANIZED HEALTH CARE EDUCATION/TRAINING PROGRAM

## 2022-02-24 PROCEDURE — 87522 HEPATITIS C REVRS TRNSCRPJ: CPT | Performed by: STUDENT IN AN ORGANIZED HEALTH CARE EDUCATION/TRAINING PROGRAM

## 2022-02-24 PROCEDURE — 85025 COMPLETE CBC W/AUTO DIFF WBC: CPT | Performed by: STUDENT IN AN ORGANIZED HEALTH CARE EDUCATION/TRAINING PROGRAM

## 2022-02-24 PROCEDURE — 80061 LIPID PANEL: CPT | Performed by: STUDENT IN AN ORGANIZED HEALTH CARE EDUCATION/TRAINING PROGRAM

## 2022-02-24 PROCEDURE — 80053 COMPREHEN METABOLIC PANEL: CPT | Performed by: STUDENT IN AN ORGANIZED HEALTH CARE EDUCATION/TRAINING PROGRAM

## 2022-02-24 PROCEDURE — 99204 OFFICE O/P NEW MOD 45 MIN: CPT | Performed by: STUDENT IN AN ORGANIZED HEALTH CARE EDUCATION/TRAINING PROGRAM

## 2022-02-24 RX ORDER — NITROFURANTOIN 25; 75 MG/1; MG/1
100 CAPSULE ORAL 2 TIMES DAILY
Qty: 10 CAPSULE | Refills: 0 | Status: SHIPPED | OUTPATIENT
Start: 2022-02-24 | End: 2022-04-07

## 2022-02-24 NOTE — PROGRESS NOTES
Subjective:  Sultana Saldana is a 61 y.o. female who presents for     Malodorous urine; patient states for the last 7 days, has had foul-smelling urine.  Denied any dysuria, abdominal pain, fever, chills, flank pain.  Limited EMR available however has had E. coli with resistance to ampicillin, cefazolin, Bactrim in the past.  Denies any allergies to antibiotics.    Epilepsy; Pt states that has not had a seizure for a while, reports having one in the waiting room today, attributes it to stress, anxiety and panic.  Is not sure what she is taking for her seizures, has general confusion with regards to medication, states this has not been a sudden change.  Denied chest pain, shortness of breath, headaches, numbness, tingling, weakness.    Bipolar disorder; patient reports that she does not know what her psychiatric diagnosis but needs a psychiatrist.  Denied SI/HI/AV hallucinations currently, does admit to history of suicide attempt in the past.  No acute complaints today, believes she is on Celexa.    Patient Active Problem List   Diagnosis   • Nausea   • Right shoulder pain   • Iron deficiency anemia   • Fatigue   • Screening for diabetes mellitus   • Urinary tract infection without hematuria   • Otitis externa   • Constipation   • Uncomplicated asthma   • Dysuria   • Right upper quadrant pain   • Right upper quadrant abdominal pain   • Neck pain   • Hep C w/o coma, chronic (HCC)   • Sciatica   • Pain of back and left lower extremity   • Nondependent cocaine abuse in remission (HCC)   • Hemorrhoids   • Hematochezia   • Essential hypertension   • Epileptic seizures (HCC)   • Cocaine use disorder (HCC)   • Bipolar mood disorder (HCC)   • Arthritis of right glenohumeral joint   • Anxiety   • Allergic rhinitis     Vitals:    Vitals:    02/24/22 0838   BP: 138/86   BP Location: Left arm   Patient Position: Sitting   Cuff Size: Adult   Pulse: 96   Temp: 98.6 °F (37 °C)   SpO2: 97%   Weight: 73.6 kg (162 lb 3.2 oz)  "  Height: 161.3 cm (63.5\")     Body mass index is 28.28 kg/m².      Current Outpatient Medications:   •  albuterol (VENTOLIN HFA) 108 (90 Base) MCG/ACT inhaler, Inhale 2 puffs Every 4 (Four) Hours As Needed for Wheezing., Disp: 1 inhaler, Rfl: 5  •  fluticasone-salmeterol (ADVAIR DISKUS) 250-50 MCG/DOSE DISKUS, Inhale 1 puff 2 (Two) Times a Day., Disp: 1 each, Rfl: 5  •  lisinopril-hydrochlorothiazide (PRINZIDE,ZESTORETIC) 10-12.5 MG per tablet, take 1 tablet by mouth once daily, Disp: 30 tablet, Rfl: 2  •  loratadine (CLARITIN) 10 MG tablet, Take 1 tablet by mouth Daily., Disp: 30 tablet, Rfl: 5  •  meloxicam (MOBIC) 15 MG tablet, Take 1 tablet by mouth Daily., Disp: 30 tablet, Rfl: 5  •  Multiple Vitamin (TAB-A-RUIZ) tablet, , Disp: , Rfl:   •  OXcarbazepine (TRILEPTAL) 300 MG tablet, Take 1 tablet by mouth Every 12 (Twelve) Hours., Disp: 60 tablet, Rfl: 3  •  potassium chloride (KAYCIEL) 20 MEQ/15ML (10%) solution, Take 15 mL by mouth Daily., Disp: 450 mL, Rfl: 3  •  nitrofurantoin, macrocrystal-monohydrate, (Macrobid) 100 MG capsule, Take 1 capsule by mouth 2 (Two) Times a Day., Disp: 10 capsule, Rfl: 0    Patient Active Problem List   Diagnosis   • Nausea   • Right shoulder pain   • Iron deficiency anemia   • Fatigue   • Screening for diabetes mellitus   • Urinary tract infection without hematuria   • Otitis externa   • Constipation   • Uncomplicated asthma   • Dysuria   • Right upper quadrant pain   • Right upper quadrant abdominal pain   • Neck pain   • Hep C w/o coma, chronic (HCC)   • Sciatica   • Pain of back and left lower extremity   • Nondependent cocaine abuse in remission (HCC)   • Hemorrhoids   • Hematochezia   • Essential hypertension   • Epileptic seizures (HCC)   • Cocaine use disorder (HCC)   • Bipolar mood disorder (HCC)   • Arthritis of right glenohumeral joint   • Anxiety   • Allergic rhinitis     Past Surgical History:   Procedure Laterality Date   • COLONOSCOPY  04/12/2016   • ENDOSCOPY N/A " 9/7/2017    Procedure: ESOPHAGOGASTRODUODENOSCOPY;  Surgeon: Ruben Roldan MD;  Location: [facility] ENDOSCOPY;  Service:    • HYSTEROSCOPY  07/17/2012    exam under anesthesia, diagnostic hysteroscopy, Fractional D&C. Post menopausal bleeding   • INJECTION OF MEDICATION  02/01/2016    Celestone (betamethasone) (2)      • PAP SMEAR  10/20/2011    negative   • TONSILLECTOMY     • UPPER GASTROINTESTINAL ENDOSCOPY  11/18/2016     Social History     Socioeconomic History   • Marital status: Single   Tobacco Use   • Smoking status: Never Smoker   • Smokeless tobacco: Never Used   Substance and Sexual Activity   • Alcohol use: Yes   • Drug use: Yes     Types: Marijuana   • Sexual activity: Defer     Family History   Adopted: Yes   Problem Relation Age of Onset   • Cancer Sister      No visits with results within 6 Month(s) from this visit.   Latest known visit with results is:   Office Visit on 02/15/2018   Component Date Value Ref Range Status   • Color 02/15/2018 Yellow  Yellow, Straw, Dark Yellow, Jovita Final   • Clarity, UA 02/15/2018 Clear  Clear Final   • Glucose, UA 02/15/2018 Negative  Negative, 1000 mg/dL (3+) mg/dL Final   • Bilirubin 02/15/2018 Negative  Negative Final   • Ketones, UA 02/15/2018 Negative  Negative Final   • Specific Gravity  02/15/2018 1.015  1.005 - 1.030 Final   • Blood, UA 02/15/2018 Negative  Negative Final   • pH, Urine 02/15/2018 5.0  5.0 - 8.0 Final   • Protein, POC 02/15/2018 Negative  Negative mg/dL Final   • Urobilinogen, UA 02/15/2018 Normal  Normal Final   • Leukocytes 02/15/2018 500 Daria/ul* Negative Final   • Nitrite, UA 02/15/2018 Negative  Negative Final   • Glucose 02/15/2018 126  70 - 130 mg/dL Final   • Glucose 02/15/2018 112* 70 - 100 mg/dL Final   • BUN 02/15/2018 20  9 - 23 mg/dL Final   • Creatinine 02/15/2018 0.80  0.60 - 1.30 mg/dL Final   • Sodium 02/15/2018 139  132 - 146 mmol/L Final   • Potassium 02/15/2018 3.5  3.5 - 5.5 mmol/L Final   • Chloride 02/15/2018 107  99 -  109 mmol/L Final   • CO2 02/15/2018 29.0  20.0 - 31.0 mmol/L Final   • Calcium 02/15/2018 9.5  8.7 - 10.4 mg/dL Final   • Total Protein 02/15/2018 6.9  5.7 - 8.2 g/dL Final   • Albumin 02/15/2018 4.20  3.20 - 4.80 g/dL Final   • ALT (SGPT) 02/15/2018 61* 7 - 40 U/L Final   • AST (SGOT) 02/15/2018 52* 0 - 33 U/L Final   • Alkaline Phosphatase 02/15/2018 82  25 - 100 U/L Final   • Total Bilirubin 02/15/2018 0.4  0.3 - 1.2 mg/dL Final   • eGFR   Amer 02/15/2018 89  >60 mL/min/1.73 Final   • Globulin 02/15/2018 2.7  gm/dL Final   • A/G Ratio 02/15/2018 1.6  1.5 - 2.5 g/dL Final   • BUN/Creatinine Ratio 02/15/2018 25.0  7.0 - 25.0 Final   • Anion Gap 02/15/2018 3.0  3.0 - 11.0 mmol/L Final      XR Spine Cervical Complete 4 or 5 View (In Office)  Narrative: PROCEDURE: Five view cervical spine    COMPARISON: No comparison    HISTORY: Cervicalgia    FINDINGS:   AP, lateral, open mouth odontoid and bilateral oblique views are  obtained of the cervical spine.   The odontoid process is intact. The lateral masses of C1  demonstrate normal anatomic alignment and symmetry about the  dens.   There is degenerative disc disease at C5-6 and C6-7 manifested by  disc space narrowing and anterior osteophyte formation. There is  also uncovertebral and apophyseal joint hypertrophy at these  levels.  There is no evidence of acute fracture or subluxation.   The prevertebral soft tissues are within normal limits.  Impression: CONCLUSION:    Degenerative changes as noted above.    Electronically signed by:  Mj Burgos MD  10/17/2017 4:45 PM  CDT Workstation: [id number]      @Retevo@  Immunization History   Administered Date(s) Administered   • COVID-19 (PFIZER) PURPLE CAP 05/13/2021, 05/13/2021, 06/03/2021   • Tetanus Toxoid, Unspecified 08/05/2009     The following portions of the patient's history were reviewed and updated as appropriate: allergies, current medications, past family history, past medical history, past social  history, past surgical history and problem list.    PHQ-9 Total Score: 1       Physical Exam  Constitutional:       Appearance: Normal appearance.   HENT:      Head: Normocephalic and atraumatic.      Right Ear: External ear normal.      Left Ear: External ear normal.   Eyes:      General:         Right eye: No discharge.         Left eye: No discharge.      Conjunctiva/sclera: Conjunctivae normal.   Cardiovascular:      Rate and Rhythm: Normal rate and regular rhythm.      Pulses: Normal pulses.      Heart sounds: Normal heart sounds. No murmur heard.      Pulmonary:      Effort: Pulmonary effort is normal. No respiratory distress.      Breath sounds: Normal breath sounds.   Abdominal:      General: There is no distension.      Palpations: Abdomen is soft.      Tenderness: There is no abdominal tenderness.   Musculoskeletal:      Cervical back: Normal range of motion.      Right lower leg: No edema.      Left lower leg: No edema.      Comments: Unable to actively flex DIP of right middle finger.    Lymphadenopathy:      Cervical: No cervical adenopathy.   Neurological:      Mental Status: She is alert. Mental status is at baseline.   Psychiatric:         Mood and Affect: Mood normal.         Behavior: Behavior normal.       Assessment/Plan    Diagnosis Plan   1. Acute cystitis without hematuria  POCT urinalysis dipstick, automated    Urine Culture - Urine, Urine, Clean Catch    nitrofurantoin, macrocrystal-monohydrate, (Macrobid) 100 MG capsule   2. Bipolar affective disorder, remission status unspecified (HCC)  Ambulatory Referral to Psychiatry    CBC & Differential    Comprehensive Metabolic Panel   3. Need for hepatitis C screening test  HCV Antibody Rfx To Qnt PCR   4. Lipid screening  Lipid Panel   5. Nonintractable epilepsy without status epilepticus, unspecified epilepsy type (HCC)  Ambulatory Referral to Neurology   6. Cocaine use disorder (HCC)  Urine Drug Screen - Urine, Clean Catch      Orders Placed  This Encounter   Procedures   • Urine Culture - Urine, Urine, Clean Catch     Order Specific Question:   Release to patient     Answer:   Immediate   • Comprehensive Metabolic Panel     Order Specific Question:   Release to patient     Answer:   Immediate   • Lipid Panel   • HCV Antibody Rfx To Qnt PCR     Order Specific Question:   Release to patient     Answer:   Immediate   • Urine Drug Screen - Urine, Clean Catch     Order Specific Question:   Release to patient     Answer:   Immediate   • Ambulatory Referral to Psychiatry     Referral Priority:   Routine     Referral Type:   Behavorial Health/Psych     Referral Reason:   Specialty Services Required     Requested Specialty:   Psychiatry     Number of Visits Requested:   1   • Ambulatory Referral to Neurology     Referral Priority:   Routine     Referral Type:   Consultation     Referral Reason:   Specialty Services Required     Requested Specialty:   Neurology     Number of Visits Requested:   1   • POCT urinalysis dipstick, automated     Order Specific Question:   Release to patient     Answer:   Immediate   • CBC & Differential     Order Specific Question:   Manual Differential     Answer:   No     Acute cystitis; patient with UA suggestive of cystitis, limited records available but with evidence of resistance as stated in HPI, after discussion with patient, will start on nitrofurantoin.  Strict ER/return precaution given.    Patient with limited insight however decisional at this time, no signs of acute intoxication, danger to self at this time.  Reports multiple chronic medical conditions but unclear what medications she is currently taking, patient to bring medications to follow-up in 2 weeks, will obtain labs above.  Due to history of psychiatric illness, epilepsy, will refer to corresponding services.  Due to epilepsy mental illness, patient good candidate for active diet, form signed.      This document has been electronically signed by Raymond Capps MD  on February 24, 2022 09:30 CST    EMR Dragon/Transciption Disclaimer: Some of this note may be an electronic transcription/translation of spoken language to printed text.  The electronic translation of spoken language may permit erroneous, or at times, nonsensical words or phrases to be inadvertently transcribed. Although I have reviewed the note for such errors, some may still exist.

## 2022-02-25 DIAGNOSIS — E87.6 HYPOKALEMIA: Primary | ICD-10-CM

## 2022-02-25 RX ORDER — POTASSIUM CHLORIDE 20 MEQ/1
20 TABLET, EXTENDED RELEASE ORAL 2 TIMES DAILY
Qty: 180 TABLET | Refills: 1 | Status: SHIPPED | OUTPATIENT
Start: 2022-02-25 | End: 2022-07-08 | Stop reason: SDUPTHER

## 2022-02-26 DIAGNOSIS — E87.6 HYPOKALEMIA: ICD-10-CM

## 2022-02-26 LAB
DIAGNOSTIC IMP SPEC-IMP: NORMAL
HCV AB S/CO SERPL IA: >11 S/CO RATIO (ref 0–0.9)
HCV RNA SERPL NAA+PROBE-ACNC: NORMAL IU/ML
REF LAB TEST REF RANGE: NORMAL

## 2022-02-28 RX ORDER — POTASSIUM CHLORIDE 20 MEQ/1
TABLET, EXTENDED RELEASE ORAL
Qty: 180 TABLET | Refills: 0 | OUTPATIENT
Start: 2022-02-28

## 2022-02-28 NOTE — TELEPHONE ENCOUNTER
Rx Refill Note  Requested Prescriptions     Pending Prescriptions Disp Refills   • potassium chloride (K-DUR,KLOR-CON) 20 MEQ CR tablet [Pharmacy Med Name: POTASSIUM CL ER 20 MEQ TABLET] 180 tablet 0     Sig: TAKE ONE TABLET BY MOUTH 2 TIMES A DAY      Last office visit with prescribing clinician: 2/24/2022      Next office visit with prescribing clinician: 3/15/2022            Yogi Welch Rep  02/28/22, 08:24 CST     Refill not appropriate. Sent in on 2/25/22 for a 3 month supply with 1 refill.

## 2022-04-07 ENCOUNTER — LAB (OUTPATIENT)
Dept: LAB | Facility: HOSPITAL | Age: 62
End: 2022-04-07

## 2022-04-07 ENCOUNTER — OFFICE VISIT (OUTPATIENT)
Dept: OBSTETRICS AND GYNECOLOGY | Facility: CLINIC | Age: 62
End: 2022-04-07

## 2022-04-07 VITALS
DIASTOLIC BLOOD PRESSURE: 72 MMHG | BODY MASS INDEX: 27.14 KG/M2 | HEIGHT: 64 IN | SYSTOLIC BLOOD PRESSURE: 124 MMHG | WEIGHT: 159 LBS

## 2022-04-07 DIAGNOSIS — N36.2 URETHRAL CARUNCLE: ICD-10-CM

## 2022-04-07 DIAGNOSIS — R30.0 DYSURIA: ICD-10-CM

## 2022-04-07 DIAGNOSIS — Z01.419 WELL WOMAN EXAM WITH ROUTINE GYNECOLOGICAL EXAM: Primary | ICD-10-CM

## 2022-04-07 DIAGNOSIS — N36.8 MASS OF URETHRA: Primary | ICD-10-CM

## 2022-04-07 PROCEDURE — 87086 URINE CULTURE/COLONY COUNT: CPT | Performed by: OBSTETRICS & GYNECOLOGY

## 2022-04-07 PROCEDURE — 99204 OFFICE O/P NEW MOD 45 MIN: CPT | Performed by: OBSTETRICS & GYNECOLOGY

## 2022-04-07 RX ORDER — NITROFURANTOIN 25; 75 MG/1; MG/1
100 CAPSULE ORAL 2 TIMES DAILY
Qty: 14 CAPSULE | Refills: 0 | Status: SHIPPED | OUTPATIENT
Start: 2022-04-07 | End: 2022-04-14

## 2022-04-07 RX ORDER — IBUPROFEN 800 MG/1
800 TABLET ORAL EVERY 6 HOURS PRN
Qty: 30 TABLET | Refills: 1 | Status: SHIPPED | OUTPATIENT
Start: 2022-04-07

## 2022-04-07 NOTE — PROGRESS NOTES
Sultana Saldana is a 61 y.o. y/o female.     Chief Complaint: Vaginal pain    HPI:   61 y.o. .  No LMP recorded. Patient is postmenopausal..  Patient presents for evaluation secondary to vaginal pain.  States that this is been going on for approximately 1 month, feels like things are falling out.  A friend told her that this may be her vagina or rectum but was not certain.  Patient is here for evaluation.  States that she has significant pain with this and it is difficult to sit or walk.  No other complaints at this time.  Patient states that she is never had a Pap smear.     Review of Systems   Constitutional: Negative for chills, fatigue and fever.   HENT: Negative for sore throat.    Eyes: Negative for visual disturbance.   Respiratory: Negative for cough, shortness of breath and wheezing.    Cardiovascular: Negative for chest pain, palpitations and leg swelling.   Gastrointestinal: Negative for abdominal pain, diarrhea, nausea and vomiting.   Endocrine: Negative for cold intolerance and heat intolerance.   Genitourinary: Positive for pelvic pain and vaginal pain. Negative for dysuria, flank pain, frequency, menstrual problem, vaginal bleeding and vaginal discharge.   Skin: Negative for color change and pallor.   Neurological: Negative for syncope, light-headedness and headaches.   Psychiatric/Behavioral: Negative for dysphoric mood and suicidal ideas. The patient is not nervous/anxious.         The following portions of the patient's history were reviewed and updated as appropriate: allergies, current medications, past family history, past medical history, past social history, past surgical history and problem list.    Allergies   Allergen Reactions   • Latex Itching   • Diphenhydramine Swelling        Prior to Admission medications    Medication Sig Start Date End Date Taking? Authorizing Provider   albuterol (VENTOLIN HFA) 108 (90 Base) MCG/ACT inhaler Inhale 2 puffs Every 4 (Four) Hours As Needed for  Wheezing. 2/15/18  Yes Trisha Meade APRN   fluticasone-salmeterol (ADVAIR DISKUS) 250-50 MCG/DOSE DISKUS Inhale 1 puff 2 (Two) Times a Day. 2/15/18  Yes Trisha Meade APRN   lisinopril-hydrochlorothiazide (PRINZIDE,ZESTORETIC) 10-12.5 MG per tablet take 1 tablet by mouth once daily 6/23/18  Yes Trisha Meade APRN   loratadine (CLARITIN) 10 MG tablet Take 1 tablet by mouth Daily. 2/15/18  Yes Trisha Meade APRN   meloxicam (MOBIC) 15 MG tablet Take 1 tablet by mouth Daily. 2/15/18  Yes Trisha Meade APRN   Multiple Vitamin (TAB-A-RUIZ) tablet  1/27/18  Yes ProviderMirlande MD   OXcarbazepine (TRILEPTAL) 300 MG tablet Take 1 tablet by mouth Every 12 (Twelve) Hours. 2/15/18  Yes Trisha Meade APRN   potassium chloride (K-DUR,KLOR-CON) 20 MEQ CR tablet Take 1 tablet by mouth 2 (Two) Times a Day. 2/25/22  Yes Raymond Capps MD   potassium chloride (KAYCIEL) 20 MEQ/15ML (10%) solution Take 15 mL by mouth Daily. 2/15/18  Yes Trisha Meade APRN   nitrofurantoin, macrocrystal-monohydrate, (Macrobid) 100 MG capsule Take 1 capsule by mouth 2 (Two) Times a Day. 2/24/22 4/7/22  Raymond Capps MD        The patient has a family history of   Family History   Adopted: Yes   Problem Relation Age of Onset   • Cancer Sister         Past Medical History:   Diagnosis Date   • Acute low back pain    • Acute otitis media    • Acute sinusitis    • Acute upper respiratory infection    • Asthma    • Bunion    • Degenerative joint disease involving multiple joints    • Dysfunctional uterine bleeding    • Encounter for gynecological examination without abnormal finding    • Essential hypertension    • GEOVANI (generalized anxiety disorder)    • Ganglion and cyst of synovium, tendon and bursa    • Knee pain     strain   • Menopause    • Neck pain    • Onychomycosis    • Otitis media    • Pain in right shoulder    • Postmenopausal bleeding    • Rectal hemorrhage    • Right upper quadrant pain    • Seizure  "disorder (Formerly [facility])    • Shoulder pain     oa   • Surgery follow-up         OB History        4    Para   4    Term   2       2    AB        Living   4       SAB        IAB        Ectopic        Molar        Multiple        Live Births                     Social History     Socioeconomic History   • Marital status: Single   Tobacco Use   • Smoking status: Never Smoker   • Smokeless tobacco: Never Used   Substance and Sexual Activity   • Alcohol use: Yes   • Drug use: Yes     Types: Marijuana   • Sexual activity: Defer        Past Surgical History:   Procedure Laterality Date   • COLONOSCOPY  2016   • ENDOSCOPY N/A 2017    Procedure: ESOPHAGOGASTRODUODENOSCOPY;  Surgeon: Ruben Roldan MD;  Location: [facility] ENDOSCOPY;  Service:    • HYSTEROSCOPY  2012    exam under anesthesia, diagnostic hysteroscopy, Fractional D&C. Post menopausal bleeding   • INJECTION OF MEDICATION  2016    Celestone (betamethasone) (2)      • PAP SMEAR  10/20/2011    negative   • TONSILLECTOMY     • UPPER GASTROINTESTINAL ENDOSCOPY  2016        Patient Active Problem List   Diagnosis   • Nausea   • Right shoulder pain   • Iron deficiency anemia   • Fatigue   • Screening for diabetes mellitus   • Urinary tract infection without hematuria   • Otitis externa   • Constipation   • Uncomplicated asthma   • Dysuria   • Right upper quadrant pain   • Right upper quadrant abdominal pain   • Neck pain   • Hep C w/o coma, chronic (HCC)   • Sciatica   • Pain of back and left lower extremity   • Nondependent cocaine abuse in remission (Formerly [facility])   • Hemorrhoids   • Hematochezia   • Essential hypertension   • Epileptic seizures (Formerly [facility])   • Cocaine use disorder (Formerly [facility])   • Bipolar mood disorder (Formerly [facility])   • Arthritis of right glenohumeral joint   • Anxiety   • Allergic rhinitis        Documented Vitals    22 0836   BP: 124/72   Weight: 72.1 kg (159 lb)   Height: 161.3 cm (63.5\")   PainSc: 0-No pain        Body mass index is 27.72 " kg/m².    Physical Exam  Vitals and nursing note reviewed.   Constitutional:       General: She is not in acute distress.     Appearance: Normal appearance. She is well-developed. She is not ill-appearing, toxic-appearing or diaphoretic.   HENT:      Head: Normocephalic.      Mouth/Throat:      Mouth: Mucous membranes are moist.   Neck:      Thyroid: No thyromegaly.   Genitourinary:     General: Normal vulva.      Vagina: Normal.      Comments: Speculum exam with normal-appearing cervix and vagina.  Pap smear was obtained without difficulty.  No vaginal or uterine prolapse was noted.  No rectocele was noted.  Patient did have a very full urethra with a small 5 mm mass protruding from the urethral opening.  On palpation no clear mass was noted on the urethra however the entire urethra palpated edematous.  Given this mass and edema I felt that further evaluation by urology was prudent.  Musculoskeletal:         General: No swelling, tenderness or deformity. Normal range of motion.      Cervical back: Normal range of motion.   Skin:     General: Skin is warm and dry.      Findings: No erythema.   Neurological:      General: No focal deficit present.      Mental Status: She is alert and oriented to person, place, and time.   Psychiatric:         Mood and Affect: Mood normal.         Behavior: Behavior normal.         Thought Content: Thought content normal.         Judgment: Judgment normal.         Laboratory Data:   Lab Results - Last 18 Months   Lab Units 02/24/22  0940 11/20/20  0927   GLUCOSE mg/dL 98  --    BUN mg/dL 16 20   CREATININE mg/dL 0.88 0.90   SODIUM mmol/L 139 142   POTASSIUM mmol/L 3.3* 3.1*   CHLORIDE mmol/L 105 107   TOTAL CO2 mmol/L  --  29   CO2 mmol/L 23.4  --    CALCIUM mg/dL 9.9 9.2   TOTAL PROTEIN g/dL 7.3 7.3   ALBUMIN g/dL 4.40 4.0   ALT (SGPT) U/L 12 13   AST (SGOT) U/L 20 25   ALK PHOS U/L 80 76   BILIRUBIN mg/dL 0.2 0.3   GLOBULIN gm/dL 2.9 3.3   A/G RATIO g/dL 1.5  --    BUN / CREAT RATIO   18.2 22.2   ANION GAP mmol/L 10.6  --      Lab Results - Last 18 Months   Lab Units 02/24/22  0940 11/20/20  0927   WBC 10*3/mm3 5.61 4.81   RBC 10*6/mm3 4.68 4.36   HEMOGLOBIN g/dL 13.6 13.2   HEMATOCRIT % 41.8 40.0   MCV fL 89.3 91.7   MCH pg 29.1 30.3   MCHC g/dL 32.5 33.0   RDW % 14.8 14.7   RDW-SD fl 48.0  --    MPV fL 12.0 11.9   PLATELETS 10*3/mm3 203 154     No results for input(s): HCGQUAL in the last 93513 hours.    Last Pap smear:   Last Completed Pap Smear     This patient has no relevant Health Maintenance data.      Collected today    Assessment/Plan   Diagnoses and all orders for this visit:    1. Well woman exam with routine gynecological exam (Primary)  -     Liquid-based Pap Smear, Screening    2. Dysuria  -     Urinalysis With Microscopic - Urine, Clean Catch; Future  -     Urine Culture - Urine, Urine, Clean Catch    3. Urethral caruncle      Patient with vaginal pain likely secondary to urethral mass.  We will have patient seen by urology for further evaluation work-up.  Pap smear was collected today uncomplicated and patient tolerated well.  No evidence of vaginal or uterine prolapse at this time.  No evidence of rectal prolapse.  Patient to return to see me as needed.  Prescription for ibuprofen for pain placed and prescription for Macrobid for possible UTI placed.    This document has been electronically signed by Eitan Byrnes DO on April 7, 2022 09:05 CDT

## 2022-04-09 LAB — BACTERIA SPEC AEROBE CULT: NORMAL

## 2022-04-13 LAB
LAB AP CASE REPORT: NORMAL
PATH INTERP SPEC-IMP: NORMAL

## 2022-04-25 ENCOUNTER — OFFICE VISIT (OUTPATIENT)
Dept: OBSTETRICS AND GYNECOLOGY | Facility: CLINIC | Age: 62
End: 2022-04-25

## 2022-04-25 VITALS
HEIGHT: 64 IN | BODY MASS INDEX: 27.49 KG/M2 | WEIGHT: 161 LBS | SYSTOLIC BLOOD PRESSURE: 122 MMHG | DIASTOLIC BLOOD PRESSURE: 80 MMHG

## 2022-04-25 DIAGNOSIS — R39.89: ICD-10-CM

## 2022-04-25 DIAGNOSIS — N36.2 URETHRAL CARUNCLE: Primary | ICD-10-CM

## 2022-04-25 PROCEDURE — 99213 OFFICE O/P EST LOW 20 MIN: CPT | Performed by: NURSE PRACTITIONER

## 2022-04-25 RX ORDER — CONJUGATED ESTROGENS 0.62 MG/G
CREAM VAGINAL
Qty: 30 G | Refills: 2 | Status: SHIPPED | OUTPATIENT
Start: 2022-04-25

## 2022-04-25 RX ORDER — CITALOPRAM 20 MG/1
20 TABLET ORAL DAILY
COMMUNITY
Start: 2022-04-13

## 2022-04-25 NOTE — PROGRESS NOTES
Subjective   Sultanaameena Saldana is a 61 y.o. here to have urethra looked at again after finishing the antibiotics that Dr. Byrnes prescribed on 4/7    Feels like the pain is better but still present and the swelling is better but still present.    Dr Byrnes note explained that she had a 5mm urethra caruncle and edema of the entire urethra. He had sent her Macrobid and suggested a urology consult but pt never had that scheduled. She did finish her antibiotics.    Gynecologic Exam  The patient's pertinent negatives include no genital itching, pelvic pain, vaginal bleeding or vaginal discharge. Associated symptoms include dysuria. Pertinent negatives include no chills, fever, frequency or urgency.       The following portions of the patient's history were reviewed and updated as appropriate: allergies, current medications and problem list.    Review of Systems   Constitutional: Negative for chills and fever.   Genitourinary: Positive for dysuria. Negative for decreased urine volume, difficulty urinating, frequency, pelvic pain, pelvic pressure, urgency, urinary incontinence, vaginal bleeding, vaginal discharge and vaginal pain.       Objective   Physical Exam  Vitals and nursing note reviewed.   Constitutional:       General: She is awake. She is not in acute distress.     Appearance: Normal appearance. She is well-developed, well-groomed and overweight. She is not ill-appearing, toxic-appearing or diaphoretic.   Genitourinary:     General: Normal vulva.      Exam position: Lithotomy position.      Osmani stage (genital): 5.      Labia:         Right: No rash, tenderness, lesion or injury.         Left: No rash, tenderness, lesion or injury.       Urethra: Urethral pain, urethral swelling and urethral lesion present. No prolapse.       Neurological:      Mental Status: She is alert.   Psychiatric:         Behavior: Behavior is cooperative.           Assessment/Plan   Diagnoses and all orders for this visit:    1.  Urethral caruncle (Primary)  -     Ambulatory Referral to Urology    2. Urethrodynia  -     Ambulatory Referral to Urology    Other orders  -     conjugated estrogens (Premarin) 0.625 MG/GM vaginal cream; Insert 0.5g vaginally at bedtime 2 nights per week.  Dispense: 30 g; Refill: 2      Start on premarin cream twice weekly; RBA and potential s/e reviewed. Referral sent to urology for further evaluation of urethral edema and pain.

## 2022-06-08 ENCOUNTER — TELEPHONE (OUTPATIENT)
Dept: FAMILY MEDICINE CLINIC | Facility: CLINIC | Age: 62
End: 2022-06-08

## 2022-06-10 ENCOUNTER — TELEPHONE (OUTPATIENT)
Dept: FAMILY MEDICINE CLINIC | Facility: CLINIC | Age: 62
End: 2022-06-10

## 2022-06-10 NOTE — TELEPHONE ENCOUNTER
Patient called stated she needs a new nebulizer machine and would like a new one sent in to a medical supply store. Her cord has a short in it.

## 2022-07-08 DIAGNOSIS — E87.6 HYPOKALEMIA: ICD-10-CM

## 2022-07-08 RX ORDER — POTASSIUM CHLORIDE 20 MEQ/1
20 TABLET, EXTENDED RELEASE ORAL 2 TIMES DAILY
Qty: 180 TABLET | Refills: 0 | Status: SHIPPED | OUTPATIENT
Start: 2022-07-08

## 2022-07-08 NOTE — TELEPHONE ENCOUNTER
Called patient to let her know Dr. Capps is going to send in her med refills due to him being out of the office on 7/11/2022     No answer. Got busy tone

## 2022-07-11 ENCOUNTER — DOCUMENTATION (OUTPATIENT)
Dept: FAMILY MEDICINE CLINIC | Facility: CLINIC | Age: 62
End: 2022-07-11

## 2022-07-11 NOTE — PROGRESS NOTES
Patient called asking what she could do to get back in to seeing Dr Capps. I informed her that she has already been dismissed so there was nothing she could do. She asked if she could see another Dr in the office and I informed her she could not. She voiced understanding. I told her to try a dr at [facility]. She said she was upset because she had no family and no one she could rely on. I asked if she was suicidal and she said no. She then said that I did not need to worry about her since she is no longer allowed to be seen by us. I told her that I was worried about her and wanted to make sure she was ok. She stated again that we no longer had to be worried about her and hung up.

## 2022-10-17 NOTE — TELEPHONE ENCOUNTER
Needs appt for Med RF   [FreeTextEntry1] : 1) benign findings as above- education\par \par 2) no evidence recurrence melanocytic lesion in scar\par \par 3) incidental SCCis\par Risks and benefits were discussed including scarring, bleeding, pain, incomplete treatment and recurrence. Discussed excision and/or Mohs offer more complete treatment. Patient verbalized understanding and prefers to have LN2. \par The specified lesions were treated with liquid nitrogen cryotherapy.  Discussed risks including pain, blistering, crusting, discoloration, recurrence.\par size: 1.2 cm, upper back\par \par 4) scalp pruritus/seb derm\par clobetasol or lidex solution PRN; SED

## 2023-04-04 DIAGNOSIS — R60.0 BILATERAL LOWER EXTREMITY EDEMA: Primary | ICD-10-CM

## (undated) DEVICE — SINGLE-USE BIOPSY FORCEPS: Brand: RADIAL JAW 4

## (undated) DEVICE — CANN SMPL SOFTECH BIFLO ETCO2 A/M 7FT

## (undated) DEVICE — BITEBLOCK ENDO W/STRAP 60F A/ LF DISP